# Patient Record
Sex: MALE | Race: WHITE | NOT HISPANIC OR LATINO | Employment: OTHER | URBAN - METROPOLITAN AREA
[De-identification: names, ages, dates, MRNs, and addresses within clinical notes are randomized per-mention and may not be internally consistent; named-entity substitution may affect disease eponyms.]

---

## 2019-03-04 ENCOUNTER — AMBULATORY - HEALTHEAST (OUTPATIENT)
Dept: SCHEDULING | Facility: CLINIC | Age: 79
End: 2019-03-04

## 2019-03-04 DIAGNOSIS — C67.9 BLADDER CANCER (H): ICD-10-CM

## 2019-03-06 ENCOUNTER — RECORDS - HEALTHEAST (OUTPATIENT)
Dept: ADMINISTRATIVE | Facility: OTHER | Age: 79
End: 2019-03-06

## 2019-03-11 ENCOUNTER — HOSPITAL ENCOUNTER (OUTPATIENT)
Dept: INTERVENTIONAL RADIOLOGY/VASCULAR | Facility: HOSPITAL | Age: 79
Discharge: HOME | End: 2019-03-11
Attending: UROLOGY | Admitting: RADIOLOGY

## 2019-03-11 DIAGNOSIS — C67.9 BLADDER CANCER (H): ICD-10-CM

## 2019-03-11 LAB
HGB BLD-MCNC: 9.5 G/DL (ref 14–18)
PLATELET # BLD AUTO: 119 THOU/UL (ref 140–440)

## 2019-03-11 ASSESSMENT — MIFFLIN-ST. JEOR: SCORE: 1381.24

## 2019-03-18 ENCOUNTER — HOSPITAL ENCOUNTER (OUTPATIENT)
Dept: INTERVENTIONAL RADIOLOGY/VASCULAR | Facility: HOSPITAL | Age: 79
Discharge: HOME | End: 2019-03-18
Attending: NURSE PRACTITIONER | Admitting: RADIOLOGY

## 2019-03-18 DIAGNOSIS — N13.9 URINARY OBSTRUCTION: ICD-10-CM

## 2019-07-09 ENCOUNTER — AMBULATORY - HEALTHEAST (OUTPATIENT)
Dept: SCHEDULING | Facility: CLINIC | Age: 79
End: 2019-07-09

## 2019-07-09 DIAGNOSIS — C67.9 BLADDER CANCER (H): ICD-10-CM

## 2021-05-10 ENCOUNTER — OFFICE VISIT (OUTPATIENT)
Dept: FAMILY MEDICINE | Facility: CLINIC | Age: 81
End: 2021-05-10
Payer: COMMERCIAL

## 2021-05-10 VITALS
DIASTOLIC BLOOD PRESSURE: 64 MMHG | OXYGEN SATURATION: 94 % | BODY MASS INDEX: 28.64 KG/M2 | WEIGHT: 189 LBS | RESPIRATION RATE: 18 BRPM | HEIGHT: 68 IN | TEMPERATURE: 97.9 F | SYSTOLIC BLOOD PRESSURE: 116 MMHG | HEART RATE: 101 BPM

## 2021-05-10 DIAGNOSIS — H61.22 EXCESSIVE CERUMEN IN EAR CANAL, LEFT: Primary | ICD-10-CM

## 2021-05-10 DIAGNOSIS — C67.9 MALIGNANT NEOPLASM OF URINARY BLADDER, UNSPECIFIED SITE (H): ICD-10-CM

## 2021-05-10 PROCEDURE — 99202 OFFICE O/P NEW SF 15 MIN: CPT | Mod: 25 | Performed by: NURSE PRACTITIONER

## 2021-05-10 PROCEDURE — 69210 REMOVE IMPACTED EAR WAX UNI: CPT | Mod: LT | Performed by: NURSE PRACTITIONER

## 2021-05-10 ASSESSMENT — MIFFLIN-ST. JEOR: SCORE: 1542.83

## 2021-06-02 VITALS — HEIGHT: 67 IN | BODY MASS INDEX: 24.48 KG/M2 | WEIGHT: 156 LBS

## 2021-06-11 ENCOUNTER — OFFICE VISIT (OUTPATIENT)
Dept: FAMILY MEDICINE | Facility: CLINIC | Age: 81
End: 2021-06-11
Payer: COMMERCIAL

## 2021-06-11 VITALS
BODY MASS INDEX: 29.98 KG/M2 | DIASTOLIC BLOOD PRESSURE: 66 MMHG | SYSTOLIC BLOOD PRESSURE: 130 MMHG | RESPIRATION RATE: 16 BRPM | HEIGHT: 67 IN | TEMPERATURE: 99 F | HEART RATE: 88 BPM | OXYGEN SATURATION: 95 % | WEIGHT: 191 LBS

## 2021-06-11 DIAGNOSIS — H93.A1 PULSATILE TINNITUS OF RIGHT EAR: Primary | ICD-10-CM

## 2021-06-11 DIAGNOSIS — H90.3 BILATERAL SENSORINEURAL HEARING LOSS: ICD-10-CM

## 2021-06-11 DIAGNOSIS — R42 DIZZINESS: ICD-10-CM

## 2021-06-11 LAB
ANION GAP SERPL CALCULATED.3IONS-SCNC: 5 MMOL/L (ref 3–14)
BUN SERPL-MCNC: 20 MG/DL (ref 7–30)
CALCIUM SERPL-MCNC: 8.5 MG/DL (ref 8.5–10.1)
CHLORIDE SERPL-SCNC: 96 MMOL/L (ref 94–109)
CO2 SERPL-SCNC: 26 MMOL/L (ref 20–32)
CREAT SERPL-MCNC: 1.67 MG/DL (ref 0.66–1.25)
ERYTHROCYTE [DISTWIDTH] IN BLOOD BY AUTOMATED COUNT: 13.6 % (ref 10–15)
GFR SERPL CREATININE-BSD FRML MDRD: 38 ML/MIN/{1.73_M2}
GLUCOSE SERPL-MCNC: 88 MG/DL (ref 70–99)
HCT VFR BLD AUTO: 40.7 % (ref 40–53)
HGB BLD-MCNC: 14.1 G/DL (ref 13.3–17.7)
MCH RBC QN AUTO: 32.6 PG (ref 26.5–33)
MCHC RBC AUTO-ENTMCNC: 34.6 G/DL (ref 31.5–36.5)
MCV RBC AUTO: 94 FL (ref 78–100)
PLATELET # BLD AUTO: 196 10E9/L (ref 150–450)
POTASSIUM SERPL-SCNC: 4.6 MMOL/L (ref 3.4–5.3)
RBC # BLD AUTO: 4.33 10E12/L (ref 4.4–5.9)
SODIUM SERPL-SCNC: 127 MMOL/L (ref 133–144)
WBC # BLD AUTO: 5.6 10E9/L (ref 4–11)

## 2021-06-11 PROCEDURE — 85027 COMPLETE CBC AUTOMATED: CPT | Performed by: NURSE PRACTITIONER

## 2021-06-11 PROCEDURE — 80048 BASIC METABOLIC PNL TOTAL CA: CPT | Performed by: NURSE PRACTITIONER

## 2021-06-11 PROCEDURE — 99214 OFFICE O/P EST MOD 30 MIN: CPT | Performed by: NURSE PRACTITIONER

## 2021-06-11 PROCEDURE — 36415 COLL VENOUS BLD VENIPUNCTURE: CPT | Performed by: NURSE PRACTITIONER

## 2021-06-11 ASSESSMENT — PAIN SCALES - GENERAL: PAINLEVEL: NO PAIN (0)

## 2021-06-11 ASSESSMENT — MIFFLIN-ST. JEOR: SCORE: 1540

## 2021-06-11 NOTE — LETTER
June 14, 2021      Deniz Cortes  [id number] [facility] [address]        Dear ,    We are writing to inform you of your test results.    Your test results fall within the expected range(s) or remain unchanged from previous results.  Please continue with current treatment plan.    Resulted Orders   Basic metabolic panel  (Ca, Cl, CO2, Creat, Gluc, K, Na, BUN)   Result Value Ref Range    Sodium 127 (L) 133 - 144 mmol/L    Potassium 4.6 3.4 - 5.3 mmol/L    Chloride 96 94 - 109 mmol/L    Carbon Dioxide 26 20 - 32 mmol/L    Anion Gap 5 3 - 14 mmol/L    Glucose 88 70 - 99 mg/dL    Urea Nitrogen 20 7 - 30 mg/dL    Creatinine 1.67 (H) 0.66 - 1.25 mg/dL    GFR Estimate 38 (L) >60 mL/min/[1.73_m2]      Comment:      Non  GFR Calc  Starting 12/18/2018, serum creatinine based estimated GFR (eGFR) will be   calculated using the Chronic Kidney Disease Epidemiology Collaboration   (CKD-EPI) equation.      GFR Estimate If Black 44 (L) >60 mL/min/[1.73_m2]      Comment:       GFR Calc  Starting 12/18/2018, serum creatinine based estimated GFR (eGFR) will be   calculated using the Chronic Kidney Disease Epidemiology Collaboration   (CKD-EPI) equation.      Calcium 8.5 8.5 - 10.1 mg/dL   CBC with platelets   Result Value Ref Range    WBC 5.6 4.0 - 11.0 10e9/L    RBC Count 4.33 (L) 4.4 - 5.9 10e12/L    Hemoglobin 14.1 13.3 - 17.7 g/dL    Hematocrit 40.7 40.0 - 53.0 %    MCV 94 78 - 100 fl    MCH 32.6 26.5 - 33.0 pg    MCHC 34.6 31.5 - 36.5 g/dL    RDW 13.6 10.0 - 15.0 %    Platelet Count 196 150 - 450 10e9/L     Sodium  level is low. I would recommend reducing amount of water to that he is consuming to help regulate this again. Discussed ENT appointment and follow up if symptoms are not improving or worsening prior to upcoming appointment. Kidney function when compared to previous labs that were completed through care everywhere is stable.   If you have any questions or  concerns, please call the clinic at the number listed above.       Sincerely,      Karolina Fernandez, APRN CNP/mh

## 2021-06-11 NOTE — RESULT ENCOUNTER NOTE
Called patient with results from blood work. NA level is low. I would recommend reducing amount of water to that he is consuming to help regulate this again. Discussed ENT appointment and follow up if symptoms are not improving or worsening prior to upcoming appointment. Kidney function when compared to previous labs that were completed through care everywhere is stable.  Karolina Fernandez, EDGAR CNP

## 2021-06-11 NOTE — PROGRESS NOTES
"    Assessment & Plan       ICD-10-CM    1. Pulsatile tinnitus of right ear  H93.A1 Basic metabolic panel  (Ca, Cl, CO2, Creat, Gluc, K, Na, BUN)     CBC with platelets     OTOLARYNGOLOGY REFERRAL     CANCELED: CTA Head Neck with Contrast   2. Dizziness  R42 Basic metabolic panel  (Ca, Cl, CO2, Creat, Gluc, K, Na, BUN)     CBC with platelets     OTOLARYNGOLOGY REFERRAL     CANCELED: CTA Head Neck with Contrast   3. Bilateral sensorineural hearing loss  H90.3 OTOLARYNGOLOGY REFERRAL     CANCELED: CTA Head Neck with Contrast     Would like to rule out a neurologic cause of the pulsatile tinnitus of dizziness and hearing loss.  Further evaluation warranted.  Initially was going to get a CTA of the head however decided an ENT referral for their decision on appropriate imaging for this individual.  Will get labs today to evaluate for any significant abnormalities.  Advised patient to continue to manage his dizziness by increasing his water intake as this has been working for him.  If symptoms significantly worsen I did advise patient to follow-up sooner.  ENT appointment is scheduled for 6/23.             BMI:   Estimated body mass index is 29.91 kg/m  as calculated from the following:    Height as of this encounter: 1.702 m (5' 7\").    Weight as of this encounter: 86.6 kg (191 lb).           Return if symptoms worsen or fail to improve.    EDGAR Rooney CNP  [facility]   Sarbjit is a 81 year old who presents for the following health issues     HPI   PT needs hearing aids  Dizziness  Onset/Duration: 1 month  Description:   Do you feel faint: no  Does it feel like the surroundings (bed, room) are moving: YES  Unsteady/off balance: YES  Have you passed out or fallen: no  Intensity: moderate, severe  Progression of Symptoms: same and intermittent  Accompanying Signs & Symptoms:  Heart palpitations or chest pain: no  Nausea, vomiting: YES- went first started but not now  Weakness " or lack of coordination in arms or legs: no  Vision or speech changes: YES- vision  Numbness or tingling: no  Ringing in ears (Tinnitus): YES- little can hear his heart beat  Hearing Loss: YES  History:   Head trauma/concussion history: YES- 60 years ago  Previous similar symptoms: no  Recent bleeding history: no  Any new medications (BP?): no  Precipitating factors:   Worse with activity: not sure  Worse with head movement: YES  Alleviating factors:   Does staying in a fixed position give relief: no   Therapies tried and outcome: None    He came in on 5/10 and had his ears cleaned. He reports within a couple of days of getting his ears cleaned he started to notice dizziness. He increased his water intake to a full glass of water every 2 hours and then he doesn't get dizzy, reporting about 80oz a day. When he drinks more water he then is able to resolve the dizziness.  Then empty's his leg bag. He also mentions persistent pulsatile tinnitus in his right ear. No left sided symptoms. Dizziness occurs when he is sitting and the whole room is spinning. He has been aware of the dizziness and assures he sits down if it occurs when he is standing. He does not notice any symptoms with position changes.     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      Objective    /66 (BP Location: Right arm, Patient Position: Chair, Cuff Size: Adult Regular)   Pulse 88    "Temp 99  F (37.2  C) (Tympanic)   Resp 16   Ht 1.702 m (5' 7\")   Wt 86.6 kg (191 lb)   SpO2 95%   BMI 29.91 kg/m    Body mass index is 29.91 kg/m .     Physical Exam   GENERAL: healthy, alert and no distress  HENT: ear canals and TM's normal, nose and mouth without ulcers or lesions  NECK: no adenopathy, no asymmetry, masses, or scars and thyroid normal to palpation  RESP: lungs clear to auscultation - no rales, rhonchi or wheezes  CV: regular rate and rhythm, normal S1 S2, no S3 or S4, no murmur, click or rub, no peripheral edema and peripheral pulses strong  ABDOMEN: soft, nontender, no hepatosplenomegaly, no masses and bowel sounds normal  MS: no gross musculoskeletal defects noted, no edema  NEURO: Normal strength and tone, mentation intact and speech normal                "

## 2021-06-22 NOTE — PROGRESS NOTES
Chief Complaint   Patient presents with     Tinnitus     Check hearing/ears/pulsatile tinnitus- issues for a few years/did have chemo and this a happened awhile after this was done/he has gotten use to it but hearing is decreased also     History of Present Illness  Deniz Cortes is a 81 year old male who presents for ear evaluation.  I am seeing this patient in consultation for pulsatile tinnitus of right ear, dizziness, bilateral sensorineural hearing loss at the request of the provider Karolina Fernandez CNP.  The patient describes long history of decreased hearing in both ears worse in his right ear.  He is also had intermittent problems with pulsatile tinnitus in the right ear for several months to over a year.  He denies any otalgia, otorrhea, bloody otorrhea.  He does not really feel like he has much dizziness or unsteadiness.  He is not had previous ear surgery.  He did have bladder cancer treated with chemotherapy about 2 years ago.  He does have some noise exposure history in the past.      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     CARDIOVASCULAR SCREENING; LDL GOAL LESS THAN 160     Advanced directives, counseling/discussion     Cataract     Malignant neoplasm of urinary bladder, unspecified site (H)     Current Medications    Current Outpatient Medications:      acetaminophen (TYLENOL) 500 MG tablet, Take 500-1,000 mg by mouth, Disp: , Rfl:      aspirin 325 MG EC tablet, Take by mouth daily, Disp: 40 tablet, Rfl:      betamethasone dipropionate (DIPROSONE) 0.05 % external lotion, Apply topically to affected area(s) 2 times daily., Disp: , Rfl:      docusate sodium (DSS) 100 MG capsule, Take 100 mg by mouth, Disp: , Rfl:      EPINEPHrine (EPIPEN) 0.3 MG/0.3ML injection, Inject 0.3 mLs into the muscle once as needed for anaphylaxis for 1 dose. (Patient not taking: Reported on 5/10/2021), Disp: 1 each, Rfl: 3    Allergies   Allergies   Allergen  Reactions     Bee Venom Anaphylaxis     Pcn [Penicillins] Nausea and Vomiting     vomiting       Social History   Social History     Socioeconomic History     Marital status:      Spouse name: Not on file     Number of children: Not on file     Years of education: Not on file     Highest education level: Not on file   Occupational History     Not on file   Social Needs     Financial resource strain: Not on file     Food insecurity     Worry: Not on file     Inability: Not on file     Transportation needs     Medical: Not on file     Non-medical: Not on file   Tobacco Use     Smoking status: Former Smoker     Types: Cigarettes     Quit date: 2007     Years since quittin.1     Smokeless tobacco: Never Used   Substance and Sexual Activity     Alcohol use: No     Drug use: No     Sexual activity: Not Currently   Lifestyle     Physical activity     Days per week: Not on file     Minutes per session: Not on file     Stress: Not on file   Relationships     Social connections     Talks on phone: Not on file     Gets together: Not on file     Attends Christianity service: Not on file     Active member of club or organization: Not on file     Attends meetings of clubs or organizations: Not on file     Relationship status: Not on file     Intimate partner violence     Fear of current or ex partner: Not on file     Emotionally abused: Not on file     Physically abused: Not on file     Forced sexual activity: Not on file   Other Topics Concern     Parent/sibling w/ CABG, MI or angioplasty before 65F 55M? No   Social History Narrative     Not on file       Family History  Family History   Problem Relation Age of Onset     Cardiovascular Father        Review of Systems  As per HPI and PMHx, otherwise 10+ comprehensive system review is negative.    Physical Exam  BP (!) 146/69 (BP Location: Left arm, Patient Position: Sitting, Cuff Size: Adult Regular)   Pulse 75   Temp 98.5  F (36.9  C) (Tympanic)   Ht 1.702 m (5'  "7\")   Wt 90.7 kg (200 lb)   BMI 31.32 kg/m    GENERAL: Patient is a pleasant, cooperative 81 year old male in no acute distress.  HEAD: Normocephalic, atraumatic.  Hair and scalp are normal.  EYES: Pupils are equal, round, reactive to light and accommodation.  Extraocular movements are intact.  The sclera nonicteric without injection.  The extraocular structures are normal.  EARS: See below.  NOSE: Nares are patent.  Nasal mucosa is slightly dry.  The patient is a fairly significant caudal nasal septal deviation towards the left.  No nasal cavity masses, polyps, or mucopurulence on anterior rhinoscopy.  NEUROLOGIC: Cranial nerves II through XII are grossly intact.  Voice is strong.  Patient is House-Brackmann I/VI bilaterally.     CARDIOVASCULAR: Extremities are warm and well-perfused.  No significant peripheral edema.  RESPIRATORY: Patient has nonlabored breathing without cough, wheeze, stridor.  PSYCHIATRIC: Patient is alert and oriented.  Mood and affect appear normal.  SKIN: Warm and dry.  No scalp, face, or neck lesions noted.    Physical Exam and Procedure    EARS: Normal shape and symmetry.  No tenderness when palpating the mastoid or tragal areas bilaterally.  The ears were then examined under the otic binocular microscopel.  Otoscopic exam on the right reveals a clear canal.  The right tympanic membrane was round, intact with evidence of serous middle ear effusion.  No retraction, granulation, drainage, or evidence of cholesteatoma.      Attention was turned to the left ear.  Otoscopic exam on the left reveals a mostly clear canal, there are few hairs that I removed with an alligator forceps. The left tympanic membrane was round, intact without evidence of effusion.  No retraction, granulation, drainage, or evidence of cholesteatoma.      Audiogram  The patient underwent an audiogram performed today.  My review of the audiogram shows moderate sloping to profound mixed hearing loss in the right ear and " moderately severe merrily sensorineural, possibly some mixed component in the left ear.  Pure-tone average is 78 dB on the right and 76 dB on the left.  Speech reception threshhold is 60 dB on the right and 85 dB on the left.  The patient had 60% word recognition on the right and 56% word recognition on the left.  The patient had a low volume type B tympanogram on the right and a type A shallow tympanogram on the left.     Assessment and Plan     ICD-10-CM    1. 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 OME (otitis media with effusion), right  H65.91    3. Pulsatile tinnitus, right ear  H93.A1      It was my pleasure seeing Deniz Cortes today in clinic.  The patient presents with bilateral mixed hearing loss more so in the right ear.  He has obvious middle ear effusion on exam on the right.  He has a fairly significant sensorineural component in both ears.  He would be a good candidate for bilateral hearing aids.  We discussed ear tube placement in the right ear to help remove the fluid and help his hearing.  Even if we clear his effusion, he will likely still need hearing aids.  I would recommend placement of an ear tube on the right today in office.  We can see him back for follow-up and recheck the hearing.  We can also set him up for hearing aid evaluation.    We discussed the risks, benefits, alternatives, options of right myringotomy with tube placement including, but not limited to: Risk of bleeding, risk of infection, risk of retained tympanostomy tube, risk of tympanic membrane perforation, risk of recurrent otorrhea, tympanostomy tube failure, potential need for additional procedures including tube removal/replacement.  We discussed the postoperative course and convalescence including using eardrops.  The patient voiced understanding and is willing to proceed.  Please see the procedure note for further details.    Sarbjit to follow up with Primary Care provider regarding  elevated blood pressure.    Sundar Yang MD  Department of Otolarygology-Head and Neck Surgery  [facility]

## 2021-06-23 ENCOUNTER — OFFICE VISIT (OUTPATIENT)
Dept: AUDIOLOGY | Facility: CLINIC | Age: 81
End: 2021-06-23
Payer: COMMERCIAL

## 2021-06-23 ENCOUNTER — OFFICE VISIT (OUTPATIENT)
Dept: OTOLARYNGOLOGY | Facility: CLINIC | Age: 81
End: 2021-06-23
Payer: COMMERCIAL

## 2021-06-23 VITALS
TEMPERATURE: 98.5 F | BODY MASS INDEX: 31.39 KG/M2 | SYSTOLIC BLOOD PRESSURE: 146 MMHG | DIASTOLIC BLOOD PRESSURE: 69 MMHG | HEART RATE: 75 BPM | WEIGHT: 200 LBS | HEIGHT: 67 IN

## 2021-06-23 DIAGNOSIS — H90.6 MIXED CONDUCTIVE AND SENSORINEURAL HEARING LOSS OF BOTH EARS: Primary | ICD-10-CM

## 2021-06-23 DIAGNOSIS — H65.91 OME (OTITIS MEDIA WITH EFFUSION), RIGHT: ICD-10-CM

## 2021-06-23 DIAGNOSIS — H93.A9 PULSATILE TINNITUS: ICD-10-CM

## 2021-06-23 DIAGNOSIS — H93.A1 PULSATILE TINNITUS, RIGHT EAR: ICD-10-CM

## 2021-06-23 DIAGNOSIS — H90.6 MIXED HEARING LOSS, BILATERAL: Primary | ICD-10-CM

## 2021-06-23 PROCEDURE — 99207 PR NO CHARGE LOS: CPT | Performed by: AUDIOLOGIST

## 2021-06-23 PROCEDURE — 69433 CREATE EARDRUM OPENING: CPT | Mod: RT | Performed by: OTOLARYNGOLOGY

## 2021-06-23 PROCEDURE — 92550 TYMPANOMETRY & REFLEX THRESH: CPT | Performed by: AUDIOLOGIST

## 2021-06-23 PROCEDURE — 92557 COMPREHENSIVE HEARING TEST: CPT | Performed by: AUDIOLOGIST

## 2021-06-23 PROCEDURE — 99203 OFFICE O/P NEW LOW 30 MIN: CPT | Mod: 25 | Performed by: OTOLARYNGOLOGY

## 2021-06-23 RX ORDER — DOCUSATE SODIUM 100 MG/1
100 CAPSULE, LIQUID FILLED ORAL
COMMUNITY
Start: 2019-07-05 | End: 2023-08-17

## 2021-06-23 RX ORDER — ACETAMINOPHEN 500 MG
500-1000 TABLET ORAL
COMMUNITY
Start: 2019-07-05 | End: 2021-08-16

## 2021-06-23 RX ORDER — BETAMETHASONE DIPROPIONATE 0.5 MG/G
LOTION TOPICAL
COMMUNITY
Start: 2021-03-29 | End: 2023-08-17

## 2021-06-23 ASSESSMENT — MIFFLIN-ST. JEOR: SCORE: 1580.82

## 2021-06-23 NOTE — PROGRESS NOTES
AUDIOLOGY REPORT:    Patient was referred to [facility] from ENT by Dr. Yang for a hearing examination. Patient reports a fluctuating hearing loss of left ear for the past year. Patient also reports he can hear his heartbeat in his ears. Patient was unaccompanied to today's visit.     Testing:    Otoscopy:   Otoscopic exam indicates ears are clear of cerumen bilaterally     Tympanograms:    RIGHT: restricted eardrum mobility (Type B)     LEFT:   restricted eardrum mobility (Type B)    Reflexes (reported by stimulus ear): 1000 Hz  RIGHT: Ipsilateral is could not seal  RIGHT: Contralateral is could not seal  LEFT:   Ipsilateral is absent at frequencies tested  LEFT:   Contralateral is absent at frequencies tested    Thresholds:   Pure Tone Thresholds assessed using conventional audiometry with good  reliability from 250-8000 Hz bilaterally using insert earphones and circumaural headphones     RIGHT:  moderate and profound mixed hearing loss    LEFT:    moderate-severe and severe mixed hearing loss    NOTE: Change in transducers did not merit a change in thresholds. Could not mask for bone conduction at 3000 & 4000 Hz bilaterally and 250 & 500 Hz in the right ear due to equipment limits and patient tolerance.     Speech Reception Threshold:    RIGHT: 60 dB HL    LEFT:   85 dB HL    Word Recognition Score:     RIGHT: 60% at 90 dB HL using NU-6 recorded word list.    LEFT:   56% at 100 dB HL using NU-6 recorded word list.    Discussed results with the patient.     Patient was returned to ENT for follow up.     Elier Jorgensen CCC-A  Licensed Audiologist  6/23/2021

## 2021-06-23 NOTE — PATIENT INSTRUCTIONS
Per physician instructions      If you have questions or concerns on any instructions given to you by your provider today or if you need to schedule an appointment, you can reach us at [phone].

## 2021-06-23 NOTE — NURSING NOTE
"Initial BP (!) 146/69 (BP Location: Left arm, Patient Position: Sitting, Cuff Size: Adult Regular)   Pulse 75   Temp 98.5  F (36.9  C) (Tympanic)   Ht 1.702 m (5' 7\")   Wt 90.7 kg (200 lb)   BMI 31.32 kg/m   Estimated body mass index is 31.32 kg/m  as calculated from the following:    Height as of this encounter: 1.702 m (5' 7\").    Weight as of this encounter: 90.7 kg (200 lb). .    Gemma Roman CMA    "

## 2021-06-23 NOTE — PROCEDURES
PREOPERATIVE DIAGNOSES: Right chronic otitis media with effusion, mixed hearing loss.    POSTOPERATIVE DIAGNOSES: Same.     PROCEDURE PERFORMED:   1. Right myringotomy with tympanostomy tube placement     SURGEON: Sundar Yang MD      ASSISTANTS: None.     BLOOD LOSS: Scant.     COMPLICATIONS: None.      SPECIMENS: None.     ANESTHESIA: Local.     GRAFTS, IMPLANTS, DRAINS: None.     INDICATIONS: Prevent complications, treat disease.    FINDINGS:   1. Right intact tympanic membrane with serous middle-ear effusion.    OPERATIVE TECHNIQUE: The patient was brought to the procedure room and identified by name clinic number.  They were placed supinely on the procedure chair.  The patient was prepped and draped in standard fashion.  After standard surgical pause, the microscope was brought to the field and used throughout the remainder of the case.  I first examined the ear on the right.  Cerumen was cleaned with curette.  Phenol was placed in the posterior-inferior quadrant.  A radial myringotomy was made in the posterior-inferior quadrant.  The middle ear was suctioned free.  The patient noticed improvement in hearing.  A Dura-Vent ear tube was placed into the myringotomy.       This marked the end of the procedure.  The patient tolerated the procedure well.  There were no complications.  There was minimal blood loss.  All standard protocol and universal precautions were used throughout the procedure.    Sundar Yang MD  Department of Otolarygology-Head and Neck Surgery  [facility]

## 2021-06-23 NOTE — LETTER
6/23/2021         RE: Deniz Cortes  [id number] [facility] [address]        Dear Colleague,    Thank you for referring your patient, Deniz Cortes, to the [facility]. Please see a copy of my visit note below.    Chief Complaint   Patient presents with     Tinnitus     Check hearing/ears/pulsatile tinnitus- issues for a few years/did have chemo and this a happened awhile after this was done/he has gotten use to it but hearing is decreased also     History of Present Illness  Deniz Cortes is a 81 year old male who presents for ear evaluation.  I am seeing this patient in consultation for pulsatile tinnitus of right ear, dizziness, bilateral sensorineural hearing loss at the request of the provider Karolina Fernandez CNP.  The patient describes long history of decreased hearing in both ears worse in his right ear.  He is also had intermittent problems with pulsatile tinnitus in the right ear for several months to over a year.  He denies any otalgia, otorrhea, bloody otorrhea.  He does not really feel like he has much dizziness or unsteadiness.  He is not had previous ear surgery.  He did have bladder cancer treated with chemotherapy about 2 years ago.  He does have some noise exposure history in the past.      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     CARDIOVASCULAR SCREENING; LDL GOAL LESS THAN 160     Advanced directives, counseling/discussion     Cataract     Malignant neoplasm of urinary bladder, unspecified site (H)     Current Medications    Current Outpatient Medications:      acetaminophen (TYLENOL) 500 MG tablet, Take 500-1,000 mg by mouth, Disp: , Rfl:      aspirin 325 MG EC tablet, Take by mouth daily, Disp: 40 tablet, Rfl:      betamethasone dipropionate (DIPROSONE) 0.05 % external lotion, Apply topically to affected area(s) 2 times daily., Disp: , Rfl:      docusate sodium (DSS) 100 MG  capsule, Take 100 mg by mouth, Disp: , Rfl:      EPINEPHrine (EPIPEN) 0.3 MG/0.3ML injection, Inject 0.3 mLs into the muscle once as needed for anaphylaxis for 1 dose. (Patient not taking: Reported on 5/10/2021), Disp: 1 each, Rfl: 3    Allergies   Allergies   Allergen Reactions     Bee Venom Anaphylaxis     Pcn [Penicillins] Nausea and Vomiting     vomiting       Social History   Social History     Socioeconomic History     Marital status:      Spouse name: Not on file     Number of children: Not on file     Years of education: Not on file     Highest education level: Not on file   Occupational History     Not on file   Social Needs     Financial resource strain: Not on file     Food insecurity     Worry: Not on file     Inability: Not on file     Transportation needs     Medical: Not on file     Non-medical: Not on file   Tobacco Use     Smoking status: Former Smoker     Types: Cigarettes     Quit date: 2007     Years since quittin.1     Smokeless tobacco: Never Used   Substance and Sexual Activity     Alcohol use: No     Drug use: No     Sexual activity: Not Currently   Lifestyle     Physical activity     Days per week: Not on file     Minutes per session: Not on file     Stress: Not on file   Relationships     Social connections     Talks on phone: Not on file     Gets together: Not on file     Attends Yarsani service: Not on file     Active member of club or organization: Not on file     Attends meetings of clubs or organizations: Not on file     Relationship status: Not on file     Intimate partner violence     Fear of current or ex partner: Not on file     Emotionally abused: Not on file     Physically abused: Not on file     Forced sexual activity: Not on file   Other Topics Concern     Parent/sibling w/ CABG, MI or angioplasty before 65F 55M? No   Social History Narrative     Not on file       Family History  Family History   Problem Relation Age of Onset     Cardiovascular Father   "      Review of Systems  As per HPI and PMHx, otherwise 10+ comprehensive system review is negative.    Physical Exam  BP (!) 146/69 (BP Location: Left arm, Patient Position: Sitting, Cuff Size: Adult Regular)   Pulse 75   Temp 98.5  F (36.9  C) (Tympanic)   Ht 1.702 m (5' 7\")   Wt 90.7 kg (200 lb)   BMI 31.32 kg/m    GENERAL: Patient is a pleasant, cooperative 81 year old male in no acute distress.  HEAD: Normocephalic, atraumatic.  Hair and scalp are normal.  EYES: Pupils are equal, round, reactive to light and accommodation.  Extraocular movements are intact.  The sclera nonicteric without injection.  The extraocular structures are normal.  EARS: See below.  NOSE: Nares are patent.  Nasal mucosa is slightly dry.  The patient is a fairly significant caudal nasal septal deviation towards the left.  No nasal cavity masses, polyps, or mucopurulence on anterior rhinoscopy.  NEUROLOGIC: Cranial nerves II through XII are grossly intact.  Voice is strong.  Patient is House-Brackmann I/VI bilaterally.     CARDIOVASCULAR: Extremities are warm and well-perfused.  No significant peripheral edema.  RESPIRATORY: Patient has nonlabored breathing without cough, wheeze, stridor.  PSYCHIATRIC: Patient is alert and oriented.  Mood and affect appear normal.  SKIN: Warm and dry.  No scalp, face, or neck lesions noted.    Physical Exam and Procedure    EARS: Normal shape and symmetry.  No tenderness when palpating the mastoid or tragal areas bilaterally.  The ears were then examined under the otic binocular microscopel.  Otoscopic exam on the right reveals a clear canal.  The right tympanic membrane was round, intact with evidence of serous middle ear effusion.  No retraction, granulation, drainage, or evidence of cholesteatoma.      Attention was turned to the left ear.  Otoscopic exam on the left reveals a mostly clear canal, there are few hairs that I removed with an alligator forceps. The left tympanic membrane was round, " intact without evidence of effusion.  No retraction, granulation, drainage, or evidence of cholesteatoma.      Audiogram  The patient underwent an audiogram performed today.  My review of the audiogram shows moderate sloping to profound mixed hearing loss in the right ear and moderately severe merrily sensorineural, possibly some mixed component in the left ear.  Pure-tone average is 78 dB on the right and 76 dB on the left.  Speech reception threshhold is 60 dB on the right and 85 dB on the left.  The patient had 60% word recognition on the right and 56% word recognition on the left.  The patient had a low volume type B tympanogram on the right and a type A shallow tympanogram on the left.     Assessment and Plan     ICD-10-CM    1. 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 OME (otitis media with effusion), right  H65.91    3. Pulsatile tinnitus, right ear  H93.A1      It was my pleasure seeing Deniz Cortes today in clinic.  The patient presents with bilateral mixed hearing loss more so in the right ear.  He has obvious middle ear effusion on exam on the right.  He has a fairly significant sensorineural component in both ears.  He would be a good candidate for bilateral hearing aids.  We discussed ear tube placement in the right ear to help remove the fluid and help his hearing.  Even if we clear his effusion, he will likely still need hearing aids.  I would recommend placement of an ear tube on the right today in office.  We can see him back for follow-up and recheck the hearing.  We can also set him up for hearing aid evaluation.    We discussed the risks, benefits, alternatives, options of right myringotomy with tube placement including, but not limited to: Risk of bleeding, risk of infection, risk of retained tympanostomy tube, risk of tympanic membrane perforation, risk of recurrent otorrhea, tympanostomy tube failure, potential need for additional procedures including  tube removal/replacement.  We discussed the postoperative course and convalescence including using eardrops.  The patient voiced understanding and is willing to proceed.  Please see the procedure note for further details.    Sarbjit to follow up with Primary Care provider regarding elevated blood pressure.    Sundar Yang MD  Department of Otolarygology-Head and Neck Surgery  [facility]         Again, thank you for allowing me to participate in the care of your patient.        Sincerely,        Sundar Yang MD

## 2021-06-24 NOTE — H&P
H&P documented within 30 days. I have performed and assessment and examined the patient, as necessary, to update the patient's current status that may have changed since the prior History and Physical.       Patricia Hood MD, [facility]  Vascular and Interventional Radiology  Pager: [phone]  Clinic: [phone]

## 2021-06-24 NOTE — H&P
[facility] Radiology History and Physical Note    Procedure Requested: Right ureteral stent placement, right nephrostomy tube removal   Requesting Provider: Dr. Sanchez    HPI: Deniz Cortes is a 79 y.o. old male with a history of bladder cancer with the mass obstructing his right ureter causing right hydronephrosis s/p right PNT placement at [facility] 2/13/19.  Patient is currently undergoing chemotherapy.  He presents back per request of urology, Dr. Sanchez today for right ureteral stent placement and right nephrostomy tube removal.      IMAGING:   IR Right PNT Placement 2/13/19:    FINDINGS:    Ultrasound demonstrates moderate right-sided hydronephrosis.    The antegrade nephrostogram confirms access into the right renal through the lower pole. Moderate hydronephrosis is present.  The completion image shows the right percutaneous nephrostomy with loop formed within the right renal pelvis.    IMPRESSION:      Successful right percutaneous nephrostomy placement, as detailed above.      NPO Status: Midnight   Anticoagulation/Antiplatelets/Bleeding tendencies: None   Antibiotics: Levaquin dose ordered pre procedure       PAST MEDICAL HISTORY:   Past Medical History:   Diagnosis Date     Cancer (H)     bladder       PAST SURGICAL HISTORY:  History reviewed. No pertinent surgical history.    ALLERGIES:  Penicillins    MEDICATIONS:  Current Outpatient Medications   Medication Sig Dispense Refill     docusate sodium (COLACE) 100 MG capsule Take 100 mg by mouth 2 (two) times a day as needed for constipation.       ondansetron (ZOFRAN) 4 MG tablet Take 4 mg by mouth every 8 (eight) hours as needed for nausea.       Current Facility-Administered Medications   Medication Dose Route Frequency Provider Last Rate Last Dose     levoFLOXacin 500 mg/100 mL IVPB 500 mg (LEVAQUIN)  500 mg Intravenous 30 Min Pre-Op Carina Mercado, NP         sodium chloride bacteriostatic 0.9 % injection 0.1-0.3 mL  0.1-0.3 mL  "Subcutaneous PRN Carina Mercado, NP         sodium chloride flush 3 mL (NS)  3 mL Intravenous Line Care Carina Mercado NP           LABS:  No results found for: INR  Hemoglobin (g/dL)   Date Value   03/11/2019 9.5 (L)     3/11/2019 Platelets pending     EXAM:  /68   Pulse 81   Temp 97.8  F (36.6  C) (Oral)   Resp 16   Ht 5' 7\" (1.702 m)   Wt 156 lb (70.8 kg)   SpO2 97%   BMI 24.43 kg/m    General: Lying in bed, pleasantly talkative, in no acute distress.  Neuro: A&O x 3. Moves all extremities equally.  Resp: Lungs clear to auscultation bilaterally.  Cardio: S1S2 and reg, without murmur, clicks or rubs  Abdomen/: Soft, non-distended, non-tender.  Right PNT to gravity bag with clear yellow urine.  Skin exit site c/d/i.  No leaking.    MSK: No gross motor weakness. Sensation intact.       Pre-Sedation Assessment:  Mallampati Airway Classification: Class 2: upper half of tonsil fossa visible  Previous reaction to anesthesia/sedation: no  Sedation plan based on assessment: Moderate  Sleep Apnea: no  Dentures: no  COPD: no  ASA Classification: ASA 3 - Patient with moderate systemic disease with functional limitations  Comments: no hx of asthma     ASSESSMENT: 79 y.o. old male with a history of bladder cancer with the mass obstructing his right ureter causing right hydronephrosis s/p right PNT placement at [facility] 2/13/19 currently undergoing chemotherapy that presents back per request of urology, Dr. Sanchez today for right ureteral stent placement and right nephrostomy tube removal.      Platelets pending.      PLAN: Attempt at right ureteral stent placement with possible right PNT removal with sedation as needed.      The procedure, risks and moderate sedation were discussed with the patient, all questions answered and patient agrees to proceed with the procedure. Written consent obtained.    Carina TAYLOR [facility]: Interventional Radiology   [phone]  "

## 2021-06-25 NOTE — H&P
[facility] Radiology History and Physical Note  Date/Time: 3/18/2019/7:35 AM    Procedure Requested: Right Nephrostogram   Requesting Provider: Jacki Mercado CNP    HPI: Deniz Cortes is a 79 y.o. male with a history of bladder cancer with the mass obstructing his right ureter causing right hydronephrosis s/p right PNT placement at [facility] 2/13/19, s/p right ureteral stent placement 3/5/19 and right PNT was capped.  Patient is currently undergoing chemotherapy. Patient presents today for right nephrostogram with possible removal of right PNT. Patient denies problems since capping trial initiated, fevers, chills, pain, etc.     IMAGING:   [facility] RADIOLOGY  LOCATION: [facility]  DATE: 3/11/2019  PROCEDURE: ANTEGRADE PYELOGRAM, PERCUTANEOUS URETERAL STENT PLACEMENT WITH NEPHROSTOMY TUBE EXCHANGE  INTERVENTIONAL RADIOLOGIST: Patricia Hood MD  INDICATION: Right ureter vesicular junction obstruction status post nephrostomy tube placement. Plan for right ureteral stent placement.    IMPRESSION:   1. Successful right ureteral stent placement. A 10 Slovak nephrostomy tube was left behind and capped.     PLAN: Repeat antegrade nephrostogram later this week followed by likely nephrostomy tube removal under fluoroscopic guidance.    Anticoagulation/Antiplatelets/Bleeding tendencies: None  Antibiotics: None    REVIEW OF SYMPTOMS: A 10 point comprehensive review of systems was negative except as noted above in HPI    PAST MEDICAL HISTORY:   Past Medical History:   Diagnosis Date     Cancer (H)     bladder       PAST SURGICAL HISTORY:   Past Surgical History:   Procedure Laterality Date     IR NEPHROURETERAL CATHETER PLACEMENT RIGHT  3/11/2019       ALLERGIES:  Penicillins    MEDICATIONS:  Current Outpatient Medications   Medication Sig Dispense Refill     docusate sodium (COLACE) 100 MG capsule Take 100 mg by mouth 2 (two) times a day.              ondansetron (ZOFRAN) 4 MG tablet Take 4 mg by mouth every 8  (eight) hours as needed for nausea.       No current facility-administered medications for this encounter.      EXAM:  There were no vitals taken for this visit.  General: Stable. In no acute distress.  Neuro: A&O x 3.   Resp: Non labored respirations   : Right PNT exit site without erythema or drainage, capped.     ASSESSMENT: 79 year old male with hx bladder cancer with mass obstructing his right ureter causing right hydronephrosis s/p right PNT placement at [facility] 2/13/19, s/p right ureteral stent placement 3/5/19     PLAN: Right nephrostogram with possible right nephrostomy tube removal     The procedure and risks were discussed with the patient, all questions answered and patient agrees to proceed with the procedure. Written consent obtained.    Arely Mojica, CNP    [facility]: Interventional Radiology   [phone]

## 2021-06-25 NOTE — PROCEDURES
IR Procedure Note    Physician: Lorenzo Apodaca MD    Procedure:  Right nephrostogram    Findings/Plan:  Right nephrostogram through the existing right nephrostomy tube demonstrates patent right ureteral stent with adequate prompt flow of contrast through the stent into the bladder.  The right nephrostomy tube was removed.

## 2021-07-16 NOTE — PROGRESS NOTES
Chief Complaint   Patient presents with     Ent Problem     Check hearing/ears- right ear drainage since tube placement -6-23-21     History of Present Illness  Deniz Cortes is a 81 year old male who presents today for follow-up.  I evaluated the patient on 6/23/2021.  He was having pulsatile tinnitus in the right ear and decreased hearing bilaterally that was worse on the right.  He had obvious middle ear effusion on the right-hand side.  We placed an ear tube on the right.  The patient returns today for follow-up audiometric assessment.    Since last seeing the patient, the patient reports intermittent right ear drainage since placement on 6/23/2021.  He denies any otalgia or bloody otorrhea.  The otorrhea is yellow but not foul-smelling.  He has some imbalance but no significant vertigo.  He has had some noise exposure history in the past.  Aside from his recent ear tube, no other prior history of ear surgery.    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  Call if drainage persistent past 10 days., Disp: 10 mL, Rfl: 3     acetaminophen (TYLENOL) 500 MG tablet, Take 500-1,000 mg by mouth (Patient not taking: Reported on 7/21/2021), Disp: , Rfl:      docusate sodium (DSS) 100 MG capsule, Take 100 mg by mouth (Patient not taking: Reported on 7/21/2021), Disp: , Rfl:      EPINEPHrine (EPIPEN) 0.3  MG/0.3ML injection, Inject 0.3 mLs into the muscle once as needed for anaphylaxis for 1 dose. (Patient not taking: Reported on 5/10/2021), Disp: 1 each, Rfl: 3    Allergies  Allergies   Allergen Reactions     Augmentin Hives     Bee Venom Anaphylaxis     Lac Bovis GI Disturbance     Pcn [Penicillins] Nausea and Vomiting     vomiting       Social History  Social History     Socioeconomic History     Marital status:      Spouse name: Not on file     Number of children: Not on file     Years of education: Not on file     Highest education level: Not on file   Occupational History     Not on file   Tobacco Use     Smoking status: Former Smoker     Types: Cigarettes     Quit date: 2007     Years since quittin.1     Smokeless tobacco: Never Used   Substance and Sexual Activity     Alcohol use: No     Drug use: No     Sexual activity: Not Currently   Other Topics Concern     Parent/sibling w/ CABG, MI or angioplasty before 65F 55M? No   Social History Narrative     Not on file     Social Determinants of Health     Financial Resource Strain:      Difficulty of Paying Living Expenses:    Food Insecurity:      Worried About Running Out of Food in the Last Year:      Ran Out of Food in the Last Year:    Transportation Needs:      Lack of Transportation (Medical):      Lack of Transportation (Non-Medical):    Physical Activity:      Days of Exercise per Week:      Minutes of Exercise per Session:    Stress:      Feeling of Stress :    Social Connections:      Frequency of Communication with Friends and Family:      Frequency of Social Gatherings with Friends and Family:      Attends Tenriism Services:      Active Member of Clubs or Organizations:      Attends Club or Organization Meetings:      Marital Status:    Intimate Partner Violence:      Fear of Current or Ex-Partner:      Emotionally Abused:      Physically Abused:      Sexually Abused:        Family History  Family History   Problem Relation Age of  "Onset     Cardiovascular Father        Review of Systems  As per HPI and PMHx, otherwise 10 system review including the head and neck, constitutional, eyes, respiratory, GI, skin, neurologic, lymphatic, endocrine, and allergy systems is negative.    Physical Exam  BP (!) 152/83 (BP Location: Right arm, Patient Position: Sitting, Cuff Size: Adult Regular)   Pulse 81   Temp 98.8  F (37.1  C) (Tympanic)   Ht 1.702 m (5' 7\")   Wt 87.1 kg (192 lb)   BMI 30.07 kg/m    GENERAL: Patient is a pleasant, cooperative 81 year old male in no acute distress.  HEAD: Normocephalic, atraumatic.  Hair and scalp are normal.  EYES: Pupils are equal, round, reactive to light and accommodation.  Extraocular movements are intact.  The sclera nonicteric without injection.  The extraocular structures are normal.  EARS: See below.  NEUROLOGIC: Cranial nerves II through XII are grossly intact.  Voice is strong.  Facial nerve examination incomplete due to the patient wearing a mask.    CARDIOVASCULAR: Extremities are warm and well-perfused.  No significant peripheral edema.  RESPIRATORY: Patient has nonlabored breathing without cough, wheeze, stridor.  PSYCHIATRIC: Patient is alert and oriented.  Mood and affect appear normal.  SKIN: Warm and dry.  No scalp, face, or neck lesions noted.    Physical Exam and Procedure    EARS: Normal shape and symmetry.  No tenderness when palpating the mastoid or tragal areas bilaterally.  The ears were then examined under the otic binocular microscope to perform cerumen removal.  Otoscopic exam on the right reveals copious otorrhea and impacted cerumen down to the level of the tympanic membrane.  Took a culture of the otorrhea and sent for microbiologic analysis.  The otorrhea and cerumen were removed with #3 and #5 suction.  There is a Dura-Vent ear tube in the posterior-inferior quadrant.  The middle ear space was suctioned free.  There is no granulation.  No significant retraction or evidence of " cholesteatoma.      Attention was turned to the left ear.  Otoscopic exam on the left reveals a minimal amount of cerumen.  The left tympanic membrane was round, intact without evidence of effusion.  No retraction, granulation, drainage, or evidence of cholesteatoma.      Assessment and Plan     ICD-10-CM    1. Mixed conductive and sensorineural hearing loss of both ears  H90.6 Remove Cerumen     Respiratory Aerobic Bacterial Culture with Gram Stain     Gram stain     Yeast culture     ciprofloxacin-dexamethasone (CIPRODEX) 0.3-0.1 % otic suspension   2. OME (otitis media with effusion), right  H65.91 Remove Cerumen     Respiratory Aerobic Bacterial Culture with Gram Stain     Gram stain     Yeast culture     ciprofloxacin-dexamethasone (CIPRODEX) 0.3-0.1 % otic suspension   3. Pulsatile tinnitus, right ear  H93.A1 Remove Cerumen     Respiratory Aerobic Bacterial Culture with Gram Stain     Gram stain     Yeast culture     ciprofloxacin-dexamethasone (CIPRODEX) 0.3-0.1 % otic suspension   4. Otorrhea, right  H92.11 Remove Cerumen     Respiratory Aerobic Bacterial Culture with Gram Stain     Gram stain     Yeast culture     ciprofloxacin-dexamethasone (CIPRODEX) 0.3-0.1 % otic suspension   5. Retained myringotomy tube in right ear  Z96.22 Remove Cerumen     Respiratory Aerobic Bacterial Culture with Gram Stain     Gram stain     Yeast culture     ciprofloxacin-dexamethasone (CIPRODEX) 0.3-0.1 % otic suspension   6. Impacted cerumen of right ear  H61.21       It was my pleasure seeing Deniz Cortes today in clinic.  Unfortunately, he is having active otorrhea from the right ear tube today in office.  I did take a culture and placed some Ciprodex drops in his ear.  We will place him on a course of Ciprodex drops 5 drops twice a day for 10 days.  I will contact him with the culture results if we need to change his therapy at all.  I like to see him back after drop treatment for repeat ear exam and audiometric  assessment.  The patient knows to contact me in the meantime with any problems or concerns.    Sarbjit to follow up with Primary Care provider regarding elevated blood pressure.    Sundar Yang MD  Department of Otolarygology-Head and Neck Surgery  [facility]

## 2021-07-21 ENCOUNTER — OFFICE VISIT (OUTPATIENT)
Dept: OTOLARYNGOLOGY | Facility: CLINIC | Age: 81
End: 2021-07-21
Payer: COMMERCIAL

## 2021-07-21 ENCOUNTER — OFFICE VISIT (OUTPATIENT)
Dept: AUDIOLOGY | Facility: CLINIC | Age: 81
End: 2021-07-21
Payer: COMMERCIAL

## 2021-07-21 VITALS
DIASTOLIC BLOOD PRESSURE: 83 MMHG | WEIGHT: 192 LBS | TEMPERATURE: 98.8 F | BODY MASS INDEX: 30.13 KG/M2 | HEART RATE: 81 BPM | HEIGHT: 67 IN | SYSTOLIC BLOOD PRESSURE: 152 MMHG

## 2021-07-21 DIAGNOSIS — H90.6 MIXED HEARING LOSS, BILATERAL: Primary | ICD-10-CM

## 2021-07-21 DIAGNOSIS — H92.11 OTORRHEA, RIGHT: ICD-10-CM

## 2021-07-21 DIAGNOSIS — H61.21 IMPACTED CERUMEN OF RIGHT EAR: ICD-10-CM

## 2021-07-21 DIAGNOSIS — H65.91 OME (OTITIS MEDIA WITH EFFUSION), RIGHT: ICD-10-CM

## 2021-07-21 DIAGNOSIS — H90.6 MIXED CONDUCTIVE AND SENSORINEURAL HEARING LOSS OF BOTH EARS: Primary | ICD-10-CM

## 2021-07-21 DIAGNOSIS — Z96.22 RETAINED MYRINGOTOMY TUBE IN RIGHT EAR: ICD-10-CM

## 2021-07-21 DIAGNOSIS — H93.A1 PULSATILE TINNITUS, RIGHT EAR: ICD-10-CM

## 2021-07-21 PROCEDURE — 99213 OFFICE O/P EST LOW 20 MIN: CPT | Mod: 25 | Performed by: OTOLARYNGOLOGY

## 2021-07-21 PROCEDURE — 87070 CULTURE OTHR SPECIMN AEROBIC: CPT | Performed by: OTOLARYNGOLOGY

## 2021-07-21 PROCEDURE — 99207 PR NO CHARGE LOS: CPT | Performed by: AUDIOLOGIST

## 2021-07-21 PROCEDURE — 87077 CULTURE AEROBIC IDENTIFY: CPT | Performed by: OTOLARYNGOLOGY

## 2021-07-21 PROCEDURE — 87205 SMEAR GRAM STAIN: CPT | Performed by: OTOLARYNGOLOGY

## 2021-07-21 PROCEDURE — 69210 REMOVE IMPACTED EAR WAX UNI: CPT | Performed by: OTOLARYNGOLOGY

## 2021-07-21 PROCEDURE — 87186 SC STD MICRODIL/AGAR DIL: CPT | Mod: 91 | Performed by: OTOLARYNGOLOGY

## 2021-07-21 PROCEDURE — 87102 FUNGUS ISOLATION CULTURE: CPT | Performed by: OTOLARYNGOLOGY

## 2021-07-21 RX ORDER — CIPROFLOXACIN AND DEXAMETHASONE 3; 1 MG/ML; MG/ML
SUSPENSION/ DROPS AURICULAR (OTIC)
Qty: 10 ML | Refills: 3 | Status: SHIPPED | OUTPATIENT
Start: 2021-07-21 | End: 2022-02-16

## 2021-07-21 ASSESSMENT — MIFFLIN-ST. JEOR: SCORE: 1544.54

## 2021-07-21 NOTE — PATIENT INSTRUCTIONS
Per physician instructions      If you have questions or concerns on any instructions given to you by your provider today or if you need to schedule an appointment, you can reach us at [phone].

## 2021-07-21 NOTE — NURSING NOTE
"Initial BP (!) 152/83 (BP Location: Right arm, Patient Position: Sitting, Cuff Size: Adult Regular)   Pulse 81   Temp 98.8  F (37.1  C) (Tympanic)   Ht 1.702 m (5' 7\")   Wt 87.1 kg (192 lb)   BMI 30.07 kg/m   Estimated body mass index is 30.07 kg/m  as calculated from the following:    Height as of this encounter: 1.702 m (5' 7\").    Weight as of this encounter: 87.1 kg (192 lb). .    Gemma Roman CMA    "

## 2021-07-21 NOTE — PROGRESS NOTES
Patient was being seen by ENT and audiology for a follow up on his bilateral hearing loss. Upon arrival it was noted that there was significant drainage from the right ear and hearing test was postponed. Audiology had planned on discussing hearing aid opportunities with him given his insurance plan. However, patient left before seeing audiology.     Patient will see ENT and audiology for hearing testing on 8/16 it will then be determined if the patient would like to return for a hearing aid consultation.     -Elier Jorgensen CCC-A

## 2021-07-21 NOTE — LETTER
7/21/2021         RE: Deniz Cortes  [address] [facility] [id number]        Dear Colleague,    Thank you for referring your patient, Deniz Cortes, to the [facility]. Please see a copy of my visit note below.    Chief Complaint   Patient presents with     Ent Problem     Check hearing/ears- right ear drainage since tube placement -6-23-21     History of Present Illness  Deniz Cortes is a 81 year old male who presents today for follow-up.  I evaluated the patient on 6/23/2021.  He was having pulsatile tinnitus in the right ear and decreased hearing bilaterally that was worse on the right.  He had obvious middle ear effusion on the right-hand side.  We placed an ear tube on the right.  The patient returns today for follow-up audiometric assessment.    Since last seeing the patient, the patient reports intermittent right ear drainage since placement on 6/23/2021.  He denies any otalgia or bloody otorrhea.  The otorrhea is yellow but not foul-smelling.  He has some imbalance but no significant vertigo.  He has had some noise exposure history in the past.  Aside from his recent ear tube, no other prior history of ear surgery.    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  Call if drainage persistent past 10 days., Disp: 10 mL, Rfl: 3      acetaminophen (TYLENOL) 500 MG tablet, Take 500-1,000 mg by mouth (Patient not taking: Reported on 2021), Disp: , Rfl:      docusate sodium (DSS) 100 MG capsule, Take 100 mg by mouth (Patient not taking: Reported on 2021), Disp: , Rfl:      EPINEPHrine (EPIPEN) 0.3 MG/0.3ML injection, Inject 0.3 mLs into the muscle once as needed for anaphylaxis for 1 dose. (Patient not taking: Reported on 5/10/2021), Disp: 1 each, Rfl: 3    Allergies  Allergies   Allergen Reactions     Augmentin Hives     Bee Venom Anaphylaxis     Lac Bovis GI Disturbance     Pcn [Penicillins] Nausea and Vomiting     vomiting       Social History  Social History     Socioeconomic History     Marital status:      Spouse name: Not on file     Number of children: Not on file     Years of education: Not on file     Highest education level: Not on file   Occupational History     Not on file   Tobacco Use     Smoking status: Former Smoker     Types: Cigarettes     Quit date: 2007     Years since quittin.1     Smokeless tobacco: Never Used   Substance and Sexual Activity     Alcohol use: No     Drug use: No     Sexual activity: Not Currently   Other Topics Concern     Parent/sibling w/ CABG, MI or angioplasty before 65F 55M? No   Social History Narrative     Not on file     Social Determinants of Health     Financial Resource Strain:      Difficulty of Paying Living Expenses:    Food Insecurity:      Worried About Running Out of Food in the Last Year:      Ran Out of Food in the Last Year:    Transportation Needs:      Lack of Transportation (Medical):      Lack of Transportation (Non-Medical):    Physical Activity:      Days of Exercise per Week:      Minutes of Exercise per Session:    Stress:      Feeling of Stress :    Social Connections:      Frequency of Communication with Friends and Family:      Frequency of Social Gatherings with Friends and Family:      Attends [employer] Services:      Active Member of Clubs or  "Organizations:      Attends Club or Organization Meetings:      Marital Status:    Intimate Partner Violence:      Fear of Current or Ex-Partner:      Emotionally Abused:      Physically Abused:      Sexually Abused:        Family History  Family History   Problem Relation Age of Onset     Cardiovascular Father        Review of Systems  As per HPI and PMHx, otherwise 10 system review including the head and neck, constitutional, eyes, respiratory, GI, skin, neurologic, lymphatic, endocrine, and allergy systems is negative.    Physical Exam  BP (!) 152/83 (BP Location: Right arm, Patient Position: Sitting, Cuff Size: Adult Regular)   Pulse 81   Temp 98.8  F (37.1  C) (Tympanic)   Ht 1.702 m (5' 7\")   Wt 87.1 kg (192 lb)   BMI 30.07 kg/m    GENERAL: Patient is a pleasant, cooperative 81 year old male in no acute distress.  HEAD: Normocephalic, atraumatic.  Hair and scalp are normal.  EYES: Pupils are equal, round, reactive to light and accommodation.  Extraocular movements are intact.  The sclera nonicteric without injection.  The extraocular structures are normal.  EARS: See below.  NEUROLOGIC: Cranial nerves II through XII are grossly intact.  Voice is strong.  Facial nerve examination incomplete due to the patient wearing a mask.    CARDIOVASCULAR: Extremities are warm and well-perfused.  No significant peripheral edema.  RESPIRATORY: Patient has nonlabored breathing without cough, wheeze, stridor.  PSYCHIATRIC: Patient is alert and oriented.  Mood and affect appear normal.  SKIN: Warm and dry.  No scalp, face, or neck lesions noted.    Physical Exam and Procedure    EARS: Normal shape and symmetry.  No tenderness when palpating the mastoid or tragal areas bilaterally.  The ears were then examined under the otic binocular microscope to perform cerumen removal.  Otoscopic exam on the right reveals copious otorrhea and impacted cerumen down to the level of the tympanic membrane.  Took a culture of the otorrhea " and sent for microbiologic analysis.  The otorrhea and cerumen were removed with #3 and #5 suction.  There is a Dura-Vent ear tube in the posterior-inferior quadrant.  The middle ear space was suctioned free.  There is no granulation.  No significant retraction or evidence of cholesteatoma.      Attention was turned to the left ear.  Otoscopic exam on the left reveals a minimal amount of cerumen.  The left tympanic membrane was round, intact without evidence of effusion.  No retraction, granulation, drainage, or evidence of cholesteatoma.      Assessment and Plan     ICD-10-CM    1. Mixed conductive and sensorineural hearing loss of both ears  H90.6 Remove Cerumen     Respiratory Aerobic Bacterial Culture with Gram Stain     Gram stain     Yeast culture     ciprofloxacin-dexamethasone (CIPRODEX) 0.3-0.1 % otic suspension   2. OME (otitis media with effusion), right  H65.91 Remove Cerumen     Respiratory Aerobic Bacterial Culture with Gram Stain     Gram stain     Yeast culture     ciprofloxacin-dexamethasone (CIPRODEX) 0.3-0.1 % otic suspension   3. Pulsatile tinnitus, right ear  H93.A1 Remove Cerumen     Respiratory Aerobic Bacterial Culture with Gram Stain     Gram stain     Yeast culture     ciprofloxacin-dexamethasone (CIPRODEX) 0.3-0.1 % otic suspension   4. Otorrhea, right  H92.11 Remove Cerumen     Respiratory Aerobic Bacterial Culture with Gram Stain     Gram stain     Yeast culture     ciprofloxacin-dexamethasone (CIPRODEX) 0.3-0.1 % otic suspension   5. Retained myringotomy tube in right ear  Z96.22 Remove Cerumen     Respiratory Aerobic Bacterial Culture with Gram Stain     Gram stain     Yeast culture     ciprofloxacin-dexamethasone (CIPRODEX) 0.3-0.1 % otic suspension   6. Impacted cerumen of right ear  H61.21       It was my pleasure seeing Deniz EMMANUEL Sebastian today in clinic.  Unfortunately, he is having active otorrhea from the right ear tube today in office.  I did take a culture and placed some  Ciprodex drops in his ear.  We will place him on a course of Ciprodex drops 5 drops twice a day for 10 days.  I will contact him with the culture results if we need to change his therapy at all.  I like to see him back after drop treatment for repeat ear exam and audiometric assessment.  The patient knows to contact me in the meantime with any problems or concerns.    Sarbjit to follow up with Primary Care provider regarding elevated blood pressure.    Sundar Yang MD  Department of Otolarygology-Head and Neck Surgery  [facility]         Again, thank you for allowing me to participate in the care of your patient.        Sincerely,        Sundar Yang MD

## 2021-07-25 LAB
BACTERIA SPEC CULT: ABNORMAL
GRAM STAIN RESULT: ABNORMAL

## 2021-08-16 ENCOUNTER — OFFICE VISIT (OUTPATIENT)
Dept: AUDIOLOGY | Facility: CLINIC | Age: 81
End: 2021-08-16
Payer: COMMERCIAL

## 2021-08-16 ENCOUNTER — OFFICE VISIT (OUTPATIENT)
Dept: OTOLARYNGOLOGY | Facility: CLINIC | Age: 81
End: 2021-08-16
Payer: COMMERCIAL

## 2021-08-16 VITALS
SYSTOLIC BLOOD PRESSURE: 128 MMHG | DIASTOLIC BLOOD PRESSURE: 74 MMHG | TEMPERATURE: 98.5 F | HEART RATE: 78 BPM | BODY MASS INDEX: 30.07 KG/M2 | WEIGHT: 192 LBS

## 2021-08-16 DIAGNOSIS — H90.6 MIXED CONDUCTIVE AND SENSORINEURAL HEARING LOSS OF BOTH EARS: Primary | ICD-10-CM

## 2021-08-16 DIAGNOSIS — H90.6 MIXED HEARING LOSS, BILATERAL: Primary | ICD-10-CM

## 2021-08-16 DIAGNOSIS — H65.91 OME (OTITIS MEDIA WITH EFFUSION), RIGHT: ICD-10-CM

## 2021-08-16 DIAGNOSIS — Z96.22 RETAINED MYRINGOTOMY TUBE IN RIGHT EAR: ICD-10-CM

## 2021-08-16 PROCEDURE — 99207 PR NO CHARGE LOS: CPT | Performed by: AUDIOLOGIST

## 2021-08-16 PROCEDURE — 92557 COMPREHENSIVE HEARING TEST: CPT | Performed by: AUDIOLOGIST

## 2021-08-16 PROCEDURE — 92567 TYMPANOMETRY: CPT | Performed by: AUDIOLOGIST

## 2021-08-16 PROCEDURE — 99213 OFFICE O/P EST LOW 20 MIN: CPT | Performed by: OTOLARYNGOLOGY

## 2021-08-16 ASSESSMENT — PAIN SCALES - GENERAL: PAINLEVEL: NO PAIN (0)

## 2021-08-16 NOTE — PROGRESS NOTES
AUDIOLOGY REPORT:    Patient was referred from ENT by Dr. Yang for audiology evaluation. The patient had a right PE tube placed on 6/23/2021. He was subsequently seen for follow up but testing could not be completed due to drainage in the right ear. The patient returns today and reports that his hearing is a lot better in the right ear but the left ear seems worse. The patient reports that the drainage in the right ear has stopped. He reports that he does not have ear pain or pressure. The patient reports a history of a head injury from falling out of a tree around 50 years ago. He reports a history of loud noise exposure as a , and notes that asymmetrical hearing loss was found on annual hearing tests at work. The patient reports that he has no other prior history of ear problems or ear surgery. The patient was last tested at this clinic on 6/23/2021, and results showed bilateral mixed hearing loss that was better in the right ear in the low frequencies and better in the left ear in the high frequencies.    Testing:    Otoscopy:   Otoscopic exam indicates PE tube present in the right ear with a small amount of drainage deep in the canal around the tube. The left ear is clear.    Tympanograms:    RIGHT: large ear canal volume consistent with patent PE tubes     LEFT:   restricted eardrum mobility (type As)    Thresholds:   Pure Tone Thresholds assessed using conventional audiometry with good reliability from 250-8000 Hz bilaterally using insert earphones and circumaural headphones     RIGHT:  mild to severe mixed hearing loss    LEFT:    moderately severe rising to moderate sloping to severe mixed hearing loss    Speech Reception Threshold:    RIGHT: 40 dB HL    LEFT:   65 dB HL  Results are in agreement with pure tone average.     Word Recognition Score:     RIGHT: 100% at 85 dB HL using NU-6 recorded word list.    LEFT:   40% at 100 dB HL using NU-6 recorded word list.    Discussed results with the  patient. Compared to 6/23/2021, thresholds today are 15-40 dB better at all tested frequencies in the right ear. Left ear thresholds are 10-25 dB better at 250-1000 Hz, 20 dB worse at 1500 Hz, and stable at all other tested frequencies. Word recognition is significantly better today in the right ear. Left ear word recognition is slightly poorer today, but the change is not statistically significant.    Patient was returned to ENT for follow up.     Disha Boucher, CCC-A  Licensed Audiologist #[id number]  8/16/2021

## 2021-08-16 NOTE — NURSING NOTE
"Initial /74 (BP Location: Left arm, Patient Position: Chair, Cuff Size: Adult Large)   Pulse 78   Temp 98.5  F (36.9  C) (Tympanic)   Wt 87.1 kg (192 lb)   BMI 30.07 kg/m   Estimated body mass index is 30.07 kg/m  as calculated from the following:    Height as of 7/21/21: 1.702 m (5' 7\").    Weight as of this encounter: 87.1 kg (192 lb). .    Lisha Levi LPN    "

## 2021-08-16 NOTE — PROGRESS NOTES
Chief Complaint   Patient presents with     Follow Up     recheck ears after using ear drops/repeat audio- patient states no drainage noted     History of Present Illness  Deniz Cortes is a 81 year old male who presents today for follow-up.  I 1st evaluated the patient in June 2021. He was having pulsatile tinnitus in the right ear and decreased hearing bilaterally that was worse on the right.  He had obvious middle ear effusion on the right-hand side.  We placed an ear tube on the right.  I last evaluated the patient on 7/21/2021.  He was having fairly persistent otorrhea.  We cultured his drainage, which grew Proteus, E. coli, and Klebsiella.  All bacteria were sensitive to fluoroquinolones.  He had been placed on Ciprodex drops to the right ear.  The patient returns today for follow-up audiometric assessment.     Since last seeing the patient, the patient reports  resolution of his drainage.  He reports improvement in his right ear hearing.  He denies any otalgia or otorrhea.  He has some imbalance but no significant vertigo.  He has had some noise exposure history in the past.  Aside from his recent ear tube, no other prior history of ear surgery.    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  Call if drainage persistent past 10 days. (Patient not taking: Reported on 8/16/2021), Disp: 10 mL, Rfl: 3     docusate sodium (DSS) 100 MG capsule, Take 100 mg by mouth   (Patient not taking: Reported on 2021), Disp: , Rfl:      EPINEPHrine (EPIPEN) 0.3 MG/0.3ML injection, Inject 0.3 mLs into the muscle once as needed for anaphylaxis for 1 dose. (Patient not taking: Reported on 2021), Disp: 1 each, Rfl: 3    Allergies  Allergies   Allergen Reactions     Augmentin Hives     Bee Venom Anaphylaxis     Lac Bovis GI Disturbance     Pcn [Penicillins] Nausea and Vomiting     vomiting       Social History  Social History     Socioeconomic History     Marital status:      Spouse name: Not on file     Number of children: Not on file     Years of education: Not on file     Highest education level: Not on file   Occupational History     Not on file   Tobacco Use     Smoking status: Former Smoker     Types: Cigarettes     Quit date: 2007     Years since quittin.2     Smokeless tobacco: Never Used   Substance and Sexual Activity     Alcohol use: No     Drug use: No     Sexual activity: Not Currently   Other Topics Concern     Parent/sibling w/ CABG, MI or angioplasty before 65F 55M? No   Social History Narrative     Not on file     Social Determinants of Health     Financial Resource Strain:      Difficulty of Paying Living Expenses:    Food Insecurity:      Worried About Running Out of Food in the Last Year:      Ran Out of Food in the Last Year:    Transportation Needs:      Lack of Transportation (Medical):      Lack of Transportation (Non-Medical):    Physical Activity:      Days of Exercise per Week:      Minutes of Exercise per Session:    Stress:      Feeling of Stress :    Social Connections:      Frequency of Communication with Friends and Family:      Frequency of Social Gatherings with Friends and Family:      Attends [employer] Services:      Active Member of Clubs or Organizations:      Attends Club or Organization Meetings:      Marital Status:    Intimate Partner Violence:      Fear of Current or Ex-Partner:      Emotionally Abused:      Physically Abused:       Sexually Abused:        Family History  Family History   Problem Relation Age of Onset     Cardiovascular Father        Review of Systems  As per HPI and PMHx, otherwise 10 system review including the head and neck, constitutional, eyes, respiratory, GI, skin, neurologic, lymphatic, endocrine, and allergy systems is negative.    Physical Exam  /74 (BP Location: Left arm, Patient Position: Chair, Cuff Size: Adult Large)   Pulse 78   Temp 98.5  F (36.9  C) (Tympanic)   Wt 87.1 kg (192 lb)   BMI 30.07 kg/m    GENERAL: Patient is a pleasant, cooperative 81 year old male in no acute distress.  HEAD: Normocephalic, atraumatic.  Hair and scalp are normal.  EYES: Pupils are equal, round, reactive to light and accommodation.  Extraocular movements are intact.  The sclera nonicteric without injection.  The extraocular structures are normal.  EARS: Normal shape and symmetry.  No tenderness when palpating the mastoid or tragal areas bilaterally.  Otoscopic exam reveals a mostly clear canal.  There is a small amount of moisture around the ear tube but no active otorrhea.  There is a Dura-Vent ear tube in the posterior-inferior quadrant.  The middle ear is well aerated.  No granulation or drainage.  Otoscopic exam the left reveals a clear canal.  The left tympanic membrane is round, intact without evidence of effusion, good landmarks.   NEUROLOGIC: Cranial nerves II through XII are grossly intact.  Voice is strong.  Facial nerve examination incomplete due to the patient wearing a mask.    CARDIOVASCULAR: Extremities are warm and well-perfused.  No significant peripheral edema.  RESPIRATORY: Patient has nonlabored breathing without cough, wheeze, stridor.  PSYCHIATRIC: Patient is alert and oriented.  Mood and affect appear normal.  SKIN: Warm and dry.  No scalp, face, or neck lesions noted.    Audiogram  The patient underwent an audiogram performed today.  My review of the audiogram shows mild sloping to moderately  severe mixed hearing loss in the right ear and moderate sloping to moderately severe mixed hearing loss in the left ear.  Pure-tone average is 45 dB on the right and 58 dB on the left.  Speech reception threshhold is 40 dB on the right and 65 dB on the left.  The patient had 100% word recognition on the right and 40% word recognition on the left.  The patient had a large volume type B tympanogram on the right and a type A shallow tympanogram on the left.    Assessment and Plan     ICD-10-CM    1. Mixed conductive and sensorineural hearing loss of both ears  H90.6 Adult Audiology Referral   2. OME (otitis media with effusion), right  H65.91 Adult Audiology Referral   3. Retained myringotomy tube in right ear  Z96.22 Adult Audiology Referral      It was my pleasure seeing Deniz Cortes today in clinic.  The patient has had resolution of his otorrhea on the right-hand side.  He does have bilateral mixed hearing loss that is worse on the left.  He would be a candidate for trial of amplification in both ears.  We discussed checking with insurance to see what would be covered with regards to hearing aids.  We discussed the hearing aid trial.  And the decision to return the hearing aids if they are not beneficial.  The patient will be medically clear for hearing aid evaluation.    I would recommend the patient return in 6 months for repeat ear tube check.  The patient knows to contact me sooner with any problems with otorrhea or otherwise.    Sundar Yang MD  Department of Otolarygology-Head and Neck Surgery  [facility]

## 2021-08-16 NOTE — LETTER
8/16/2021         RE: Deniz Cortes  [address] [facility] [id number]        Dear Colleague,    Thank you for referring your patient, Deniz Cortes, to the [facility]. Please see a copy of my visit note below.    Chief Complaint   Patient presents with     Follow Up     recheck ears after using ear drops/repeat audio- patient states no drainage noted     History of Present Illness  Deniz Cortes is a 81 year old male who presents today for follow-up.  I 1st evaluated the patient in June 2021. He was having pulsatile tinnitus in the right ear and decreased hearing bilaterally that was worse on the right.  He had obvious middle ear effusion on the right-hand side.  We placed an ear tube on the right.  I last evaluated the patient on 7/21/2021.  He was having fairly persistent otorrhea.  We cultured his drainage, which grew Proteus, E. coli, and Klebsiella.  All bacteria were sensitive to fluoroquinolones.  He had been placed on Ciprodex drops to the right ear.  The patient returns today for follow-up audiometric assessment.     Since last seeing the patient, the patient reports  resolution of his drainage.  He reports improvement in his right ear hearing.  He denies any otalgia or otorrhea.  He has some imbalance but no significant vertigo.  He has had some noise exposure history in the past.  Aside from his recent ear tube, no other prior history of ear surgery.    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  Call if drainage persistent past 10 days. (Patient not taking: Reported on 2021), Disp: 10 mL, Rfl: 3     docusate sodium (DSS) 100 MG capsule, Take 100 mg by mouth  (Patient not taking: Reported on 2021), Disp: , Rfl:      EPINEPHrine (EPIPEN) 0.3 MG/0.3ML injection, Inject 0.3 mLs into the muscle once as needed for anaphylaxis for 1 dose. (Patient not taking: Reported on 2021), Disp: 1 each, Rfl: 3    Allergies  Allergies   Allergen Reactions     Augmentin Hives     Bee Venom Anaphylaxis     Lac Bovis GI Disturbance     Pcn [Penicillins] Nausea and Vomiting     vomiting       Social History  Social History     Socioeconomic History     Marital status:      Spouse name: Not on file     Number of children: Not on file     Years of education: Not on file     Highest education level: Not on file   Occupational History     Not on file   Tobacco Use     Smoking status: Former Smoker     Types: Cigarettes     Quit date: 2007     Years since quittin.2     Smokeless tobacco: Never Used   Substance and Sexual Activity     Alcohol use: No     Drug use: No     Sexual activity: Not Currently   Other Topics Concern     Parent/sibling w/ CABG, MI or angioplasty before 65F 55M? No   Social History Narrative     Not on file     Social Determinants of Health     Financial Resource Strain:      Difficulty of Paying Living Expenses:    Food Insecurity:      Worried About Running Out of Food in the Last Year:      Ran Out of Food in the Last Year:    Transportation Needs:      Lack of Transportation (Medical):      Lack of Transportation (Non-Medical):    Physical Activity:      Days of Exercise per Week:      Minutes of Exercise per Session:    Stress:      Feeling of Stress :    Social Connections:      Frequency of Communication with Friends and Family:      Frequency of Social Gatherings with Friends and  Family:      Attends Protestant Services:      Active Member of Clubs or Organizations:      Attends Club or Organization Meetings:      Marital Status:    Intimate Partner Violence:      Fear of Current or Ex-Partner:      Emotionally Abused:      Physically Abused:      Sexually Abused:        Family History  Family History   Problem Relation Age of Onset     Cardiovascular Father        Review of Systems  As per HPI and PMHx, otherwise 10 system review including the head and neck, constitutional, eyes, respiratory, GI, skin, neurologic, lymphatic, endocrine, and allergy systems is negative.    Physical Exam  /74 (BP Location: Left arm, Patient Position: Chair, Cuff Size: Adult Large)   Pulse 78   Temp 98.5  F (36.9  C) (Tympanic)   Wt 87.1 kg (192 lb)   BMI 30.07 kg/m    GENERAL: Patient is a pleasant, cooperative 81 year old male in no acute distress.  HEAD: Normocephalic, atraumatic.  Hair and scalp are normal.  EYES: Pupils are equal, round, reactive to light and accommodation.  Extraocular movements are intact.  The sclera nonicteric without injection.  The extraocular structures are normal.  EARS: Normal shape and symmetry.  No tenderness when palpating the mastoid or tragal areas bilaterally.  Otoscopic exam reveals a mostly clear canal.  There is a small amount of moisture around the ear tube but no active otorrhea.  There is a Dura-Vent ear tube in the posterior-inferior quadrant.  The middle ear is well aerated.  No granulation or drainage.  Otoscopic exam the left reveals a clear canal.  The left tympanic membrane is round, intact without evidence of effusion, good landmarks.   NEUROLOGIC: Cranial nerves II through XII are grossly intact.  Voice is strong.  Facial nerve examination incomplete due to the patient wearing a mask.    CARDIOVASCULAR: Extremities are warm and well-perfused.  No significant peripheral edema.  RESPIRATORY: Patient has nonlabored breathing without cough, wheeze,  stridor.  PSYCHIATRIC: Patient is alert and oriented.  Mood and affect appear normal.  SKIN: Warm and dry.  No scalp, face, or neck lesions noted.    Audiogram  The patient underwent an audiogram performed today.  My review of the audiogram shows mild sloping to moderately severe mixed hearing loss in the right ear and moderate sloping to moderately severe mixed hearing loss in the left ear.  Pure-tone average is 45 dB on the right and 58 dB on the left.  Speech reception threshhold is 40 dB on the right and 65 dB on the left.  The patient had 100% word recognition on the right and 40% word recognition on the left.  The patient had a large volume type B tympanogram on the right and a type A shallow tympanogram on the left.    Assessment and Plan     ICD-10-CM    1. Mixed conductive and sensorineural hearing loss of both ears  H90.6 Adult Audiology Referral   2. OME (otitis media with effusion), right  H65.91 Adult Audiology Referral   3. Retained myringotomy tube in right ear  Z96.22 Adult Audiology Referral      It was my pleasure seeing Deniz Cortes today in clinic.  The patient has had resolution of his otorrhea on the right-hand side.  He does have bilateral mixed hearing loss that is worse on the left.  He would be a candidate for trial of amplification in both ears.  We discussed checking with insurance to see what would be covered with regards to hearing aids.  We discussed the hearing aid trial.  And the decision to return the hearing aids if they are not beneficial.  The patient will be medically clear for hearing aid evaluation.    I would recommend the patient return in 6 months for repeat ear tube check.  The patient knows to contact me sooner with any problems with otorrhea or otherwise.    Sundar Yang MD  Department of Otolarygology-Head and Neck Surgery  [facility]         Again, thank you for allowing me to participate in the care of your patient.        Sincerely,        Sundar LILLY  MD Cel[place]

## 2021-08-18 LAB — BACTERIA SPEC CULT: NO GROWTH

## 2022-01-14 NOTE — PROGRESS NOTES
"    Assessment & Plan     Excessive cerumen in ear canal, left  Removed successfully with lighted speculum.   - REMOVE IMPACTED CERUMEN    Malignant neoplasm of urinary bladder, unspecified site (H)  Wears a leg bag. Followed with urology.            BMI:   Estimated body mass index is 28.95 kg/m  as calculated from the following:    Height as of this encounter: 1.721 m (5' 7.75\").    Weight as of this encounter: 85.7 kg (189 lb).           Return in about 4 weeks (around 6/7/2021) for annual wellness exam.    Karolina Fernandez, EDGAR CNP  [facility]    Ehsan Schaffer is a 81 year old who presents for the following health issues  accompanied by his self :    HPI     L ear plugged had R ear cleaned 5-6-21 but could not get the L ear completely cleaned.   Needs form signed to show that he got this completed. Getting fitted for hearing aids.   No other concerns today. Denies ear pain.     Positive history of bladder cancer. He follows with urology and has a leg bag.     Review of Systems   Constitutional, HEENT, cardiovascular, pulmonary, gi and gu systems are negative, except as otherwise noted.      Objective    /64   Pulse 101   Temp 97.9  F (36.6  C) (Tympanic)   Resp 18   Ht 1.721 m (5' 7.75\")   Wt 85.7 kg (189 lb)   SpO2 94%   BMI 28.95 kg/m    Body mass index is 28.95 kg/m .  Physical Exam   GENERAL: healthy, alert and no distress  HENT: Left TM mildly occluded with soft cerumen. Removed with lighted curette. Tolerated well. Following procedure Ear canals and TM's normal, nose and mouth without ulcers or lesions                " ELYSE Lockett  Post Operative Discharge Summary      Patient: Kimberly Erazo      MR#:[id number]  Admission  Diagnosis: ruptured L ectopic pregnancy  Discharge Diagnosis: Same    Date of Admission: 1/13/2022  Date of Discharge:  1/14/2022    Procedures: laparoscopic LSO, evacuation of hemoperitoneum             Service:  Gynecology    Hospital Course:  Patient underwent above surgery and remained in the hospital for 1 day.  During that time she remained afebrile and hemodynamically stable.  On the day of discharge, she was eating, ambulating and voiding without difficulty.      Labs:    Lab Results (last 24 hours)     Procedure Component Value Units Date/Time    Tissue Pathology Exam [[id number]] Collected: 01/13/22 2311    Specimen: Tissue from Fallopian Tube, Left Updated: 01/14/22 0830    Potassium [[id number]]  (Abnormal) Collected: 01/14/22 0538    Specimen: Blood Updated: 01/14/22 0641     Potassium 3.3 mmol/L     CBC (No Diff) [[id number]]  (Abnormal) Collected: 01/14/22 0538    Specimen: Blood Updated: 01/14/22 0628     WBC 9.74 10*3/mm3      RBC 3.71 10*6/mm3      Hemoglobin 10.1 g/dL      Hematocrit 31.6 %      MCV 85.2 fL      MCH 27.2 pg      MCHC 32.0 g/dL      RDW 14.1 %      RDW-SD 43.7 fl      MPV 11.4 fL      Platelets 187 10*3/mm3     COVID PRE-OP / PRE-PROCEDURE SCREENING ORDER (NO ISOLATION) - Swab, Nasopharynx [[id number]]  (Normal) Collected: 01/13/22 2117    Specimen: Swab from Nasopharynx Updated: 01/13/22 2141    Narrative:      The following orders were created for panel order COVID PRE-OP / PRE-PROCEDURE SCREENING ORDER (NO ISOLATION) - Swab, Nasopharynx.  Procedure                               Abnormality         Status                     ---------                               -----------         ------                     COVID-19 ABBOTT IN-HOUS...[[id number]]  Normal              Final result                 Please view results for these tests on the individual orders.    COVID-19,  MARIA C IN-HOUSE,NASAL Swab (NO TRANSPORT MEDIA) 2 HR TAT - Swab, Nasopharynx [[id number]]  (Normal) Collected: 01/13/22 2117    Specimen: Swab from Nasopharynx Updated: 01/13/22 2141     COVID19 Presumptive Negative    Narrative:      Fact sheet for providers: https://www.fda.gov/media/496814/download     Fact sheet for patients: https://www.fda.gov/media/552660/download    Test performed by PCR.  If inconsistent with clinical signs and symptoms patient should be tested with different authorized molecular test.    [place] Draw [[id number]] Collected: 01/13/22 1652    Specimen: Blood Updated: 01/13/22 2100    Narrative:      The following orders were created for panel order [place] Draw.  Procedure                               Abnormality         Status                     ---------                               -----------         ------                     Green Top (Gel)[[id number]]                                  Final result               Lavender Top[[id number]]                                     Final result               Gold Top - SST[[id number]]                                   Final result               Vega Top[[id number]]                                         Final result               Light Blue Top[[id number]]                                   Final result                 Please view results for these tests on the individual orders.    Vega Top [[id number]] Collected: 01/13/22 1652    Specimen: Blood Updated: 01/13/22 2100     Extra Tube Hold for add-ons.     Comment: Auto resulted.       Urinalysis With Microscopic If Indicated (No Culture) - Urine, Clean Catch [[id number]]  (Abnormal) Collected: 01/13/22 1848    Specimen: Urine, Clean Catch Updated: 01/13/22 1858     Color, UA Yellow     Appearance, UA Cloudy     pH, UA 5.5     Specific Gravity, UA 1.013     Glucose, UA Negative     Ketones, UA 15 mg/dL (1+)     Bilirubin, UA Negative     Blood, UA Large (3+)     Protein, UA Negative     Leuk Esterase,  UA Negative     Nitrite, UA Negative     Urobilinogen, UA 0.2 E.U./dL    Urinalysis, Microscopic Only - Urine, Clean Catch [[id number]]  (Abnormal) Collected: 01/13/22 1848    Specimen: Urine, Clean Catch Updated: 01/13/22 1858     RBC, UA 31-50 /HPF      WBC, UA 0-2 /HPF      Bacteria, UA None Seen /HPF      Squamous Epithelial Cells, UA 0-2 /HPF      Hyaline Casts, UA 0-6 /LPF      Methodology Automated Microscopy    Green Top (Gel) [[id number]] Collected: 01/13/22 1652    Specimen: Blood Updated: 01/13/22 1800     Extra Tube Hold for add-ons.     Comment: Auto resulted.       Gold Top - SST [[id number]] Collected: 01/13/22 1652    Specimen: Blood Updated: 01/13/22 1800     Extra Tube Hold for add-ons.     Comment: Auto resulted.       Lavender Top [[id number]] Collected: 01/13/22 1652    Specimen: Blood Updated: 01/13/22 1800     Extra Tube hold for add-on     Comment: Auto resulted       Light Blue Top [[id number]] Collected: 01/13/22 1652    Specimen: Blood Updated: 01/13/22 1800     Extra Tube hold for add-on     Comment: Auto resulted       hCG, Quantitative, Pregnancy [[id number]] Collected: 01/13/22 1652    Specimen: Blood Updated: 01/13/22 1729     HCG Quantitative 2,672.00 mIU/mL     Narrative:      HCG Ranges by Gestational Age    Females - non-pregnant premenopausal   </= 1mIU/mL HCG  Females - postmenopausal               </= 7mIU/mL HCG    3 Weeks         5.8 -    71.2 mIU/mL  4 Weeks         9.5 -     750 mIU/mL  5 Weeks         217 -   7,138 mIU/mL  6 Weeks         158 -  31,795 mIU/mL  7 Weeks       3,697 - 163,563 mIU/mL  8 Weeks      32,065 - 149,571 mIU/mL  9 Weeks      63,803 - 151,410 mIU/mL  10 Weeks     46,509 - 186,977 mIU/mL  12 Weeks     27,832 - 210,612 mIU/mL  14 Weeks     13,950 -  62,530 mIU/mL  15 Weeks     12,039 -  70,971 mIU/mL  16 Weeks      9,040 -  56,451 mIU/mL  17 Weeks      8,175 -  55,868 mIU/mL  18 Weeks      8,099 -  58,176 mIU/mL  Results may be falsely decreased if patient  taking Biotin.      Comprehensive Metabolic Panel [[id number]] Collected: 01/13/22 1652    Specimen: Blood Updated: 01/13/22 1723     Glucose 92 mg/dL      BUN 9 mg/dL      Creatinine 0.58 mg/dL      Sodium 139 mmol/L      Potassium 4.1 mmol/L      Chloride 105 mmol/L      CO2 23.0 mmol/L      Calcium 9.3 mg/dL      Total Protein 7.2 g/dL      Albumin 4.50 g/dL      ALT (SGPT) 16 U/L      AST (SGOT) 16 U/L      Alkaline Phosphatase 77 U/L      Total Bilirubin 0.7 mg/dL      eGFR Non African Amer 116 mL/min/1.73      eGFR  African Amer 141 mL/min/1.73      Globulin 2.7 gm/dL      Comment: Calculated Result        A/G Ratio 1.7 g/dL      BUN/Creatinine Ratio 15.5     Anion Gap 11.0 mmol/L     Narrative:      GFR Normal >60  Chronic Kidney Disease <60  Kidney Failure <15      Lipase [[id number]]  (Normal) Collected: 01/13/22 1652    Specimen: Blood Updated: 01/13/22 1723     Lipase 22 U/L     CBC & Differential [[id number]]  (Abnormal) Collected: 01/13/22 1652    Specimen: Blood Updated: 01/13/22 1705    Narrative:      The following orders were created for panel order CBC & Differential.  Procedure                               Abnormality         Status                     ---------                               -----------         ------                     CBC Auto Differential[[id number]]        Abnormal            Final result                 Please view results for these tests on the individual orders.    CBC Auto Differential [[id number]]  (Abnormal) Collected: 01/13/22 1652    Specimen: Blood Updated: 01/13/22 1705     WBC 7.70 10*3/mm3      RBC 3.71 10*6/mm3      Hemoglobin 10.2 g/dL      Hematocrit 31.2 %      MCV 84.1 fL      MCH 27.5 pg      MCHC 32.7 g/dL      RDW 14.2 %      RDW-SD 43.8 fl      MPV 11.1 fL      Platelets 210 10*3/mm3      Neutrophil % 69.5 %      Lymphocyte % 22.1 %      Monocyte % 6.6 %      Eosinophil % 1.3 %      Basophil % 0.4 %      Immature Grans % 0.1 %      Neutrophils, Absolute 5.35  10*3/mm3      Lymphocytes, Absolute 1.70 10*3/mm3      Monocytes, Absolute 0.51 10*3/mm3      Eosinophils, Absolute 0.10 10*3/mm3      Basophils, Absolute 0.03 10*3/mm3      Immature Grans, Absolute 0.01 10*3/mm3      nRBC 0.0 /100 WBC           Discharge Medications     Discharge Medications      New Medications      Instructions Start Date   FeroSul 325 (65 FE) MG tablet  Generic drug: ferrous sulfate   325 mg, Oral, Daily With Breakfast      HYDROcodone-acetaminophen 5-325 MG per tablet  Commonly known as: NORCO   1 tablet, Oral, Every 4 Hours PRN      ibuprofen 600 MG tablet  Commonly known as: ADVIL,MOTRIN   600 mg, Oral, Every 6 Hours PRN      Stool Softener 100 MG capsule  Generic drug: docusate sodium   100 mg, Oral, 2 Times Daily         Continue These Medications      Instructions Start Date   IUD'S IU   Intrauterine, X 4 years              Discharge Disposition:  To Home    Discharge Condition:  Stable    Discharge Diet: regular    Activity at Discharge: pelvic rest    Follow-up Appointments  Future Appointments   Date Time Provider Department Center   1/27/2022 10:00 AM Ubaldo Olivarez PA [facility] Sarah E Borders, MD  01/14/22  14:12 EST   ELYSE Lockett  Post Operative Discharge Summary      Patient: Kimberly Erazo      MR#:[id number]  Admission  Diagnosis: @DX@  Discharge Diagnosis: Same    Date of Admission: 1/13/2022  Date of Discharge:  1/14/2022    Procedures:             Service:  Gynecology    Hospital Course:  Patient underwent  and remained in the hospital for 0 days.  During that time she remained afebrile and hemodynamically stable.  On the day of discharge, she was eating, ambulating and voiding without difficulty.      Labs:    Lab Results (last 24 hours)     Procedure Component Value Units Date/Time    Tissue Pathology Exam [[id number]] Collected: 01/13/22 2311    Specimen: Tissue from Fallopian Tube, Left Updated: 01/14/22 0830    Potassium [[id number]]  (Abnormal)  Collected: 01/14/22 0538    Specimen: Blood Updated: 01/14/22 0641     Potassium 3.3 mmol/L     CBC (No Diff) [[id number]]  (Abnormal) Collected: 01/14/22 0538    Specimen: Blood Updated: 01/14/22 0628     WBC 9.74 10*3/mm3      RBC 3.71 10*6/mm3      Hemoglobin 10.1 g/dL      Hematocrit 31.6 %      MCV 85.2 fL      MCH 27.2 pg      MCHC 32.0 g/dL      RDW 14.1 %      RDW-SD 43.7 fl      MPV 11.4 fL      Platelets 187 10*3/mm3     COVID PRE-OP / PRE-PROCEDURE SCREENING ORDER (NO ISOLATION) - Swab, Nasopharynx [[id number]]  (Normal) Collected: 01/13/22 2117    Specimen: Swab from Nasopharynx Updated: 01/13/22 2141    Narrative:      The following orders were created for panel order COVID PRE-OP / PRE-PROCEDURE SCREENING ORDER (NO ISOLATION) - Swab, Nasopharynx.  Procedure                               Abnormality         Status                     ---------                               -----------         ------                     COVID-19, ABBOTT IN-HOUS...[[id number]]  Normal              Final result                 Please view results for these tests on the individual orders.    COVID-19, ABBOTT IN-HOUSE,NASAL Swab (NO TRANSPORT MEDIA) 2 HR TAT - Swab, Nasopharynx [[id number]]  (Normal) Collected: 01/13/22 2117    Specimen: Swab from Nasopharynx Updated: 01/13/22 2141     COVID19 Presumptive Negative    Narrative:      Fact sheet for providers: https://www.fda.gov/media/618886/download     Fact sheet for patients: https://www.fda.gov/media/185787/download    Test performed by PCR.  If inconsistent with clinical signs and symptoms patient should be tested with different authorized molecular test.    [place] Draw [[id number]] Collected: 01/13/22 1652    Specimen: Blood Updated: 01/13/22 2100    Narrative:      The following orders were created for panel order [place] Draw.  Procedure                               Abnormality         Status                     ---------                               -----------          ------                     Green Top (Gel)[[id number]]                                  Final result               Lavender Top[[id number]]                                     Final result               Gold Top - SST[[id number]]                                   Final result               Vega Top[[id number]]                                         Final result               Light Blue Top[[id number]]                                   Final result                 Please view results for these tests on the individual orders.    Vega Top [[id number]] Collected: 01/13/22 1652    Specimen: Blood Updated: 01/13/22 2100     Extra Tube Hold for add-ons.     Comment: Auto resulted.       Urinalysis With Microscopic If Indicated (No Culture) - Urine, Clean Catch [[id number]]  (Abnormal) Collected: 01/13/22 1848    Specimen: Urine, Clean Catch Updated: 01/13/22 1858     Color, UA Yellow     Appearance, UA Cloudy     pH, UA 5.5     Specific Gravity, UA 1.013     Glucose, UA Negative     Ketones, UA 15 mg/dL (1+)     Bilirubin, UA Negative     Blood, UA Large (3+)     Protein, UA Negative     Leuk Esterase, UA Negative     Nitrite, UA Negative     Urobilinogen, UA 0.2 E.U./dL    Urinalysis, Microscopic Only - Urine, Clean Catch [[id number]]  (Abnormal) Collected: 01/13/22 1848    Specimen: Urine, Clean Catch Updated: 01/13/22 1858     RBC, UA 31-50 /HPF      WBC, UA 0-2 /HPF      Bacteria, UA None Seen /HPF      Squamous Epithelial Cells, UA 0-2 /HPF      Hyaline Casts, UA 0-6 /LPF      Methodology Automated Microscopy    Green Top (Gel) [[id number]] Collected: 01/13/22 1652    Specimen: Blood Updated: 01/13/22 1800     Extra Tube Hold for add-ons.     Comment: Auto resulted.       Gold Top - SST [[id number]] Collected: 01/13/22 1652    Specimen: Blood Updated: 01/13/22 1800     Extra Tube Hold for add-ons.     Comment: Auto resulted.       Lavender Top [[id number]] Collected: 01/13/22 1652    Specimen: Blood Updated: 01/13/22 1800      Extra Tube hold for add-on     Comment: Auto resulted       Light Blue Top [[id number]] Collected: 01/13/22 1652    Specimen: Blood Updated: 01/13/22 1800     Extra Tube hold for add-on     Comment: Auto resulted       hCG, Quantitative, Pregnancy [[id number]] Collected: 01/13/22 1652    Specimen: Blood Updated: 01/13/22 1729     HCG Quantitative 2,672.00 mIU/mL     Narrative:      HCG Ranges by Gestational Age    Females - non-pregnant premenopausal   </= 1mIU/mL HCG  Females - postmenopausal               </= 7mIU/mL HCG    3 Weeks         5.8 -    71.2 mIU/mL  4 Weeks         9.5 -     750 mIU/mL  5 Weeks         217 -   7,138 mIU/mL  6 Weeks         158 -  31,795 mIU/mL  7 Weeks       3,697 - 163,563 mIU/mL  8 Weeks      32,065 - 149,571 mIU/mL  9 Weeks      63,803 - 151,410 mIU/mL  10 Weeks     46,509 - 186,977 mIU/mL  12 Weeks     27,832 - 210,612 mIU/mL  14 Weeks     13,950 -  62,530 mIU/mL  15 Weeks     12,039 -  70,971 mIU/mL  16 Weeks      9,040 -  56,451 mIU/mL  17 Weeks      8,175 -  55,868 mIU/mL  18 Weeks      8,099 -  58,176 mIU/mL  Results may be falsely decreased if patient taking Biotin.      Comprehensive Metabolic Panel [[id number]] Collected: 01/13/22 1652    Specimen: Blood Updated: 01/13/22 1723     Glucose 92 mg/dL      BUN 9 mg/dL      Creatinine 0.58 mg/dL      Sodium 139 mmol/L      Potassium 4.1 mmol/L      Chloride 105 mmol/L      CO2 23.0 mmol/L      Calcium 9.3 mg/dL      Total Protein 7.2 g/dL      Albumin 4.50 g/dL      ALT (SGPT) 16 U/L      AST (SGOT) 16 U/L      Alkaline Phosphatase 77 U/L      Total Bilirubin 0.7 mg/dL      eGFR Non African Amer 116 mL/min/1.73      eGFR  African Amer 141 mL/min/1.73      Globulin 2.7 gm/dL      Comment: Calculated Result        A/G Ratio 1.7 g/dL      BUN/Creatinine Ratio 15.5     Anion Gap 11.0 mmol/L     Narrative:      GFR Normal >60  Chronic Kidney Disease <60  Kidney Failure <15      Lipase [[id number]]  (Normal) Collected: 01/13/22 7935     Specimen: Blood Updated: 01/13/22 1723     Lipase 22 U/L     CBC & Differential [[id number]]  (Abnormal) Collected: 01/13/22 1652    Specimen: Blood Updated: 01/13/22 1705    Narrative:      The following orders were created for panel order CBC & Differential.  Procedure                               Abnormality         Status                     ---------                               -----------         ------                     CBC Auto Differential[[id number]]        Abnormal            Final result                 Please view results for these tests on the individual orders.    CBC Auto Differential [[id number]]  (Abnormal) Collected: 01/13/22 1652    Specimen: Blood Updated: 01/13/22 1705     WBC 7.70 10*3/mm3      RBC 3.71 10*6/mm3      Hemoglobin 10.2 g/dL      Hematocrit 31.2 %      MCV 84.1 fL      MCH 27.5 pg      MCHC 32.7 g/dL      RDW 14.2 %      RDW-SD 43.8 fl      MPV 11.1 fL      Platelets 210 10*3/mm3      Neutrophil % 69.5 %      Lymphocyte % 22.1 %      Monocyte % 6.6 %      Eosinophil % 1.3 %      Basophil % 0.4 %      Immature Grans % 0.1 %      Neutrophils, Absolute 5.35 10*3/mm3      Lymphocytes, Absolute 1.70 10*3/mm3      Monocytes, Absolute 0.51 10*3/mm3      Eosinophils, Absolute 0.10 10*3/mm3      Basophils, Absolute 0.03 10*3/mm3      Immature Grans, Absolute 0.01 10*3/mm3      nRBC 0.0 /100 WBC           Discharge Medications     Discharge Medications      New Medications      Instructions Start Date   FeroSul 325 (65 FE) MG tablet  Generic drug: ferrous sulfate   325 mg, Oral, Daily With Breakfast      HYDROcodone-acetaminophen 5-325 MG per tablet  Commonly known as: NORCO   1 tablet, Oral, Every 4 Hours PRN      ibuprofen 600 MG tablet  Commonly known as: ADVIL,MOTRIN   600 mg, Oral, Every 6 Hours PRN      Stool Softener 100 MG capsule  Generic drug: docusate sodium   100 mg, Oral, 2 Times Daily         Continue These Medications      Instructions Start Date   IUD'S IU   Intrauterine,  X 4 years              Discharge Disposition:  To Home    Discharge Condition:  Stable    Discharge Diet: regular    Activity at Discharge: pelvic rest    Follow-up Appointments  Future Appointments   Date Time Provider Department Center   1/27/2022 10:00 AM Ubaldo Olivarez PA [facility] Sarah E Borders, MD  01/14/22  14:12 EST

## 2022-02-15 NOTE — PROGRESS NOTES
Chief Complaint   Patient presents with     Ent Problem     Follow up ears/hearing/right tube  placed 6-23-21     History of Present Illness  Deniz Cortes is a 82 year old male who presents today for follow-up. The patient has a history of pulsatile tinnitus in the right ear and decreased hearing bilaterally that was worse on the right.  He had obvious middle ear effusion on the right-hand side and we placed an ear tube on the right on 6/23/2021.  He was seen again for persistent otorrhea on the right, culture of which grew Proteus, E. coli, and Klebsiella.  All bacteria were sensitive to fluoroquinolones.  He was been placed on Ciprodex drops to the right ear and was seen for follow-up in August 2021 with resolution of his drainage.  He continued to have bilateral mixed hearing loss and we discussed amplification.  The patient returns today for tube follow-up and audiometric assessment.    Since last seeing the patient, the patient reports that his ears are stable.  He feels like his hearing is at baseline on the right-hand side. He denies any otalgia or otorrhea.  He has some imbalance but no significant vertigo.  He has had some noise exposure history in the past.  Aside from his recent ear tube, no other prior history of ear surgery.    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     CARDIOVASCULAR SCREENING; LDL GOAL LESS THAN 160     Advanced directives, counseling/discussion     Cataract     Malignant neoplasm of urinary bladder, unspecified site (H)     Current Medications    Current Outpatient Medications:      betamethasone dipropionate (DIPROSONE) 0.05 % external lotion, Apply topically to affected area(s) 2 times daily., Disp: , Rfl:      docusate sodium (DSS) 100 MG capsule, Take 100 mg by mouth  (Patient not taking: Reported on 8/16/2021), Disp: , Rfl:      EPINEPHrine (EPIPEN) 0.3 MG/0.3ML injection, Inject 0.3 mLs into the muscle once as  "needed for anaphylaxis for 1 dose. (Patient not taking: Reported on 2021), Disp: 1 each, Rfl: 3    Allergies  Allergies   Allergen Reactions     Augmentin Hives     Bee Venom Anaphylaxis     Lac Bovis GI Disturbance     Pcn [Penicillins] Nausea and Vomiting     vomiting       Social History  Social History     Socioeconomic History     Marital status:      Spouse name: Not on file     Number of children: Not on file     Years of education: Not on file     Highest education level: Not on file   Occupational History     Not on file   Tobacco Use     Smoking status: Former Smoker     Types: Cigarettes     Quit date: 2007     Years since quittin.7     Smokeless tobacco: Never Used   Substance and Sexual Activity     Alcohol use: No     Drug use: No     Sexual activity: Not Currently   Other Topics Concern     Parent/sibling w/ CABG, MI or angioplasty before 65F 55M? No   Social History Narrative     Not on file     Social Determinants of Health     Financial Resource Strain: Not on file   Food Insecurity: Not on file   Transportation Needs: Not on file   Physical Activity: Not on file   Stress: Not on file   Social Connections: Not on file   Intimate Partner Violence: Not on file   Housing Stability: Not on file       Family History  Family History   Problem Relation Age of Onset     Cardiovascular Father        Review of Systems  As per HPI and PMHx, otherwise 10 system review including the head and neck, constitutional, eyes, respiratory, GI, skin, neurologic, lymphatic, endocrine, and allergy systems is negative.    Physical Exam  BP (!) 180/84 (Patient Position: Sitting, Cuff Size: Adult Regular)   Pulse 59   Temp 98.1  F (36.7  C) (Tympanic)   Ht 1.702 m (5' 7\")   Wt 90.7 kg (200 lb)   BMI 31.32 kg/m    GENERAL: Patient is a pleasant, cooperative 82 year old male in no acute distress.  HEAD: Normocephalic, atraumatic.  Hair and scalp are normal.  EYES: Pupils are equal, round, reactive to " light and accommodation.  Extraocular movements are intact.  The sclera nonicteric without injection.  The extraocular structures are normal.  EARS: See below.    NEUROLOGIC: Cranial nerves II through XII are grossly intact.  Voice is strong.  Facial nerve examination incomplete due to the patient wearing a mask.    CARDIOVASCULAR: Extremities are warm and well-perfused.  No significant peripheral edema.  RESPIRATORY: Patient has nonlabored breathing without cough, wheeze, stridor.  PSYCHIATRIC: Patient is alert and oriented.  Mood and affect appear normal.  SKIN: Warm and dry.  No scalp, face, or neck lesions noted.    Physical Exam and Procedure    EARS: Normal shape and symmetry.  No tenderness when palpating the mastoid or tragal areas bilaterally.  The ears were then examined under the otic binocular microscope to perform cerumen removal.  Otoscopic exam on the right reveals impacted cerumen and extruded ear tube down to the level of the tympanic membrane.  The cerumen and extruded ear tube were removed with a #7 suction and alligator forceps.  The right tympanic membrane was round, intact without evidence of effusion.  No retraction, granulation, drainage, or evidence of cholesteatoma.      Attention was turned to the left ear.  Otoscopic exam on the left reveals impacted cerumen down to the level of the tympanic membrane.  The cerumen was removed with #7 suction.  The left tympanic membrane was round, intact without evidence of effusion.  No retraction, granulation, drainage, or evidence of cholesteatoma.      Audiogram  The patient underwent an audiogram performed today.  My review of the audiogram shows borderline normal hearing to 1000 Hz sloping to mild sloping to moderately severe sensorineural hearing loss in the right ear and moderate sloping to severe mixed hearing loss in the left ear.  Pure-tone average is 43 dB on the right and 63 dB on the left.  Speech reception threshold is 30 dB on the right  and 50 dB on the left.  The patient had 92% word recognition on the right and 68% word recognition on the left.  The patient had a type A tympanogram on the right and a type A shallow tympanogram on the left.    Assessment and Plan     ICD-10-CM    1. 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 Mixed conductive and sensorineural hearing loss of left ear with restricted hearing of right ear  H90.A32 Adult Audiology Referral     Microscopy, Binocular   3. Bilateral impacted cerumen  H61.23 Adult Audiology Referral     Microscopy, Binocular   4. Normal ear exam  Z01.10 Adult Audiology Referral     Microscopy, Binocular      It was my pleasure seeing Deniz Cortes today in clinic.  The patient's right ear tube has extruded and his eardrum is healed well.  He has no middle ear effusion and his hearing on the right has returned to baseline.  I would recommend observation at this time.  The patient is not currently interested in hearing aids for either ear.  He would be a candidate for amplification in both ears if he would like to pursue amplification.  We can see the patient back as needed for ear cleaning or any other hearing issues.  Patient knows to contact with any problems or concerns.    Sarbjit to follow up with Primary Care provider regarding elevated blood pressure.    Sundar Yang MD  Department of Otolaryngology-Head and Neck Surgery  [facility]

## 2022-02-16 ENCOUNTER — OFFICE VISIT (OUTPATIENT)
Dept: AUDIOLOGY | Facility: CLINIC | Age: 82
End: 2022-02-16
Payer: COMMERCIAL

## 2022-02-16 ENCOUNTER — OFFICE VISIT (OUTPATIENT)
Dept: OTOLARYNGOLOGY | Facility: CLINIC | Age: 82
End: 2022-02-16
Payer: COMMERCIAL

## 2022-02-16 VITALS
SYSTOLIC BLOOD PRESSURE: 180 MMHG | WEIGHT: 200 LBS | TEMPERATURE: 98.1 F | DIASTOLIC BLOOD PRESSURE: 84 MMHG | HEART RATE: 59 BPM | BODY MASS INDEX: 31.39 KG/M2 | HEIGHT: 67 IN

## 2022-02-16 DIAGNOSIS — H90.A32 MIXED CONDUCTIVE AND SENSORINEURAL HEARING LOSS OF LEFT EAR WITH RESTRICTED HEARING OF RIGHT EAR: ICD-10-CM

## 2022-02-16 DIAGNOSIS — H90.A21 SENSORINEURAL HEARING LOSS (SNHL) OF RIGHT EAR WITH RESTRICTED HEARING OF LEFT EAR: Primary | ICD-10-CM

## 2022-02-16 DIAGNOSIS — H61.23 BILATERAL IMPACTED CERUMEN: ICD-10-CM

## 2022-02-16 DIAGNOSIS — Z01.10 NORMAL EAR EXAM: ICD-10-CM

## 2022-02-16 PROCEDURE — 92504 EAR MICROSCOPY EXAMINATION: CPT | Performed by: OTOLARYNGOLOGY

## 2022-02-16 PROCEDURE — 92557 COMPREHENSIVE HEARING TEST: CPT | Performed by: AUDIOLOGIST

## 2022-02-16 PROCEDURE — 99207 PR NO CHARGE LOS: CPT | Performed by: AUDIOLOGIST

## 2022-02-16 PROCEDURE — 99213 OFFICE O/P EST LOW 20 MIN: CPT | Mod: 25 | Performed by: OTOLARYNGOLOGY

## 2022-02-16 PROCEDURE — 92567 TYMPANOMETRY: CPT | Performed by: AUDIOLOGIST

## 2022-02-16 NOTE — LETTER
2/16/2022         RE: Deniz Cortes  [id number] [address] [facility] [address] [facility] [id number]        Dear Colleague,    Thank you for referring your patient, Deniz Cortes, to the [facility]. Please see a copy of my visit note below.    Chief Complaint   Patient presents with     Ent Problem     Follow up ears/hearing/right tube  placed 6-23-21     History of Present Illness  Deniz Cortes is a 82 year old male who presents today for follow-up. The patient has a history of pulsatile tinnitus in the right ear and decreased hearing bilaterally that was worse on the right.  He had obvious middle ear effusion on the right-hand side and we placed an ear tube on the right on 6/23/2021.  He was seen again for persistent otorrhea on the right, culture of which grew Proteus, E. coli, and Klebsiella.  All bacteria were sensitive to fluoroquinolones.  He was been placed on Ciprodex drops to the right ear and was seen for follow-up in August 2021 with resolution of his drainage.  He continued to have bilateral mixed hearing loss and we discussed amplification.  The patient returns today for tube follow-up and audiometric assessment.    Since last seeing the patient, the patient reports that his ears are stable.  He feels like his hearing is at baseline on the right-hand side. He denies any otalgia or otorrhea.  He has some imbalance but no significant vertigo.  He has had some noise exposure history in the past.  Aside from his recent ear tube, no other prior history of ear surgery.    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     CARDIOVASCULAR SCREENING; LDL GOAL LESS THAN 160     Advanced directives, counseling/discussion     Cataract     Malignant neoplasm of urinary bladder, unspecified site (H)     Current Medications    Current Outpatient Medications:      betamethasone dipropionate (DIPROSONE) 0.05 % external lotion, Apply  topically to affected area(s) 2 times daily., Disp: , Rfl:      docusate sodium (DSS) 100 MG capsule, Take 100 mg by mouth  (Patient not taking: Reported on 2021), Disp: , Rfl:      EPINEPHrine (EPIPEN) 0.3 MG/0.3ML injection, Inject 0.3 mLs into the muscle once as needed for anaphylaxis for 1 dose. (Patient not taking: Reported on 2021), Disp: 1 each, Rfl: 3    Allergies  Allergies   Allergen Reactions     Augmentin Hives     Bee Venom Anaphylaxis     Lac Bovis GI Disturbance     Pcn [Penicillins] Nausea and Vomiting     vomiting       Social History  Social History     Socioeconomic History     Marital status:      Spouse name: Not on file     Number of children: Not on file     Years of education: Not on file     Highest education level: Not on file   Occupational History     Not on file   Tobacco Use     Smoking status: Former Smoker     Types: Cigarettes     Quit date: 2007     Years since quittin.7     Smokeless tobacco: Never Used   Substance and Sexual Activity     Alcohol use: No     Drug use: No     Sexual activity: Not Currently   Other Topics Concern     Parent/sibling w/ CABG, MI or angioplasty before 65F 55M? No   Social History Narrative     Not on file     Social Determinants of Health     Financial Resource Strain: Not on file   Food Insecurity: Not on file   Transportation Needs: Not on file   Physical Activity: Not on file   Stress: Not on file   Social Connections: Not on file   Intimate Partner Violence: Not on file   Housing Stability: Not on file       Family History  Family History   Problem Relation Age of Onset     Cardiovascular Father        Review of Systems  As per HPI and PMHx, otherwise 10 system review including the head and neck, constitutional, eyes, respiratory, GI, skin, neurologic, lymphatic, endocrine, and allergy systems is negative.    Physical Exam  BP (!) 180/84 (Patient Position: Sitting, Cuff Size: Adult Regular)   Pulse 59   Temp 98.1  F (36.7  " C) (Tympanic)   Ht 1.702 m (5' 7\")   Wt 90.7 kg (200 lb)   BMI 31.32 kg/m    GENERAL: Patient is a pleasant, cooperative 82 year old male in no acute distress.  HEAD: Normocephalic, atraumatic.  Hair and scalp are normal.  EYES: Pupils are equal, round, reactive to light and accommodation.  Extraocular movements are intact.  The sclera nonicteric without injection.  The extraocular structures are normal.  EARS: See below.    NEUROLOGIC: Cranial nerves II through XII are grossly intact.  Voice is strong.  Facial nerve examination incomplete due to the patient wearing a mask.    CARDIOVASCULAR: Extremities are warm and well-perfused.  No significant peripheral edema.  RESPIRATORY: Patient has nonlabored breathing without cough, wheeze, stridor.  PSYCHIATRIC: Patient is alert and oriented.  Mood and affect appear normal.  SKIN: Warm and dry.  No scalp, face, or neck lesions noted.    Physical Exam and Procedure    EARS: Normal shape and symmetry.  No tenderness when palpating the mastoid or tragal areas bilaterally.  The ears were then examined under the otic binocular microscope to perform cerumen removal.  Otoscopic exam on the right reveals impacted cerumen and extruded ear tube down to the level of the tympanic membrane.  The cerumen and extruded ear tube were removed with a #7 suction and alligator forceps.  The right tympanic membrane was round, intact without evidence of effusion.  No retraction, granulation, drainage, or evidence of cholesteatoma.      Attention was turned to the left ear.  Otoscopic exam on the left reveals impacted cerumen down to the level of the tympanic membrane.  The cerumen was removed with #7 suction.  The left tympanic membrane was round, intact without evidence of effusion.  No retraction, granulation, drainage, or evidence of cholesteatoma.      Audiogram  The patient underwent an audiogram performed today.  My review of the audiogram shows borderline normal hearing to 1000 Hz " sloping to mild sloping to moderately severe sensorineural hearing loss in the right ear and moderate sloping to severe mixed hearing loss in the left ear.  Pure-tone average is 43 dB on the right and 63 dB on the left.  Speech reception threshold is 30 dB on the right and 50 dB on the left.  The patient had 92% word recognition on the right and 68% word recognition on the left.  The patient had a type A tympanogram on the right and a type A shallow tympanogram on the left.    Assessment and Plan     ICD-10-CM    1. 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 Mixed conductive and sensorineural hearing loss of left ear with restricted hearing of right ear  H90.A32 Adult Audiology Referral     Microscopy, Binocular   3. Bilateral impacted cerumen  H61.23 Adult Audiology Referral     Microscopy, Binocular   4. Normal ear exam  Z01.10 Adult Audiology Referral     Microscopy, Binocular      It was my pleasure seeing Deniz Cortes today in clinic.  The patient's right ear tube has extruded and his eardrum is healed well.  He has no middle ear effusion and his hearing on the right has returned to baseline.  I would recommend observation at this time.  The patient is not currently interested in hearing aids for either ear.  He would be a candidate for amplification in both ears if he would like to pursue amplification.  We can see the patient back as needed for ear cleaning or any other hearing issues.  Patient knows to contact with any problems or concerns.    Sarbjit to follow up with Primary Care provider regarding elevated blood pressure.    Sundar Yang MD  Department of Otolaryngology-Head and Neck Surgery  [facility]         Again, thank you for allowing me to participate in the care of your patient.        Sincerely,        Sundar Yang MD

## 2022-02-16 NOTE — PROGRESS NOTES
AUDIOLOGY REPORT:    Patient was referred to [facility] from ENT by Dr. Yang for a hearing examination. Patient reports he is here for a 6 month follow up and feels the hearing in his left ear has declined. Patient was unaccompanied to today's visit.     Testing:    Otoscopy:   Otoscopic exam indicates ears have minimal cerumen bilaterally. A PE tube is visible in the right ear tympanic membrane.     Tympanograms:    RIGHT: normal eardrum mobility     LEFT:   restricted eardrum mobility (Type As)    Reflexes (reported by stimulus ear): 1000 Hz   Could not maintain a seal to obtain     Thresholds:   Pure Tone Thresholds assessed using conventional audiometry with good  reliability from 250-8000 Hz bilaterally using insert earphones and circumaural headphones     RIGHT:  normal and borderline-normal hearing sensitivity through 1500 Hz then a moderate to severe sensorineural hearing loss    LEFT:    mild and profound mixed hearing loss    NOTE: This is an improvement in hearing for the low frequencies of both ears compared to 8/16/2021 testing.     Speech Reception Threshold:    RIGHT: 30 dB HL    LEFT:   50 dB HL    Word Recognition Score:     RIGHT: 92% at 80 dB HL using NU-6 recorded word list.    LEFT:   68% at 90 dB HL using NU-6 recorded word list.    Discussed results with the patient.     Patient was returned to ENT for follow up.     Elier Jorgensen CCC-A  Licensed Audiologist  2/16/2022

## 2022-02-16 NOTE — PATIENT INSTRUCTIONS
Per physician instructions      If you have questions or concerns on any instructions given to you by your provider today or if you need to schedule an appointment, you can reach us at [phone].

## 2023-03-24 ENCOUNTER — APPOINTMENT (OUTPATIENT)
Dept: GENERAL RADIOLOGY | Facility: CLINIC | Age: 83
End: 2023-03-24
Attending: EMERGENCY MEDICINE
Payer: COMMERCIAL

## 2023-03-24 ENCOUNTER — APPOINTMENT (OUTPATIENT)
Dept: MRI IMAGING | Facility: CLINIC | Age: 83
End: 2023-03-24
Attending: EMERGENCY MEDICINE
Payer: COMMERCIAL

## 2023-03-24 ENCOUNTER — APPOINTMENT (OUTPATIENT)
Dept: CT IMAGING | Facility: CLINIC | Age: 83
End: 2023-03-24
Attending: EMERGENCY MEDICINE
Payer: COMMERCIAL

## 2023-03-24 ENCOUNTER — HOSPITAL ENCOUNTER (EMERGENCY)
Facility: CLINIC | Age: 83
Discharge: OTHER INSTITUTION | End: 2023-03-25
Attending: EMERGENCY MEDICINE | Admitting: EMERGENCY MEDICINE
Payer: COMMERCIAL

## 2023-03-24 DIAGNOSIS — Z91.041 ALLERGIC TO IV CONTRAST: ICD-10-CM

## 2023-03-24 DIAGNOSIS — I63.239 CAROTID ARTERY STENOSIS WITH CEREBRAL INFARCTION (H): ICD-10-CM

## 2023-03-24 DIAGNOSIS — I63.9 ACUTE ISCHEMIC STROKE (H): ICD-10-CM

## 2023-03-24 LAB
ANION GAP SERPL CALCULATED.3IONS-SCNC: 10 MMOL/L (ref 7–15)
APTT PPP: 31 SECONDS (ref 22–38)
BASOPHILS # BLD AUTO: 0 10E3/UL (ref 0–0.2)
BASOPHILS NFR BLD AUTO: 0 %
BUN SERPL-MCNC: 21 MG/DL (ref 8–23)
CALCIUM SERPL-MCNC: 9.4 MG/DL (ref 8.8–10.2)
CHLORIDE SERPL-SCNC: 104 MMOL/L (ref 98–107)
CREAT SERPL-MCNC: 1.81 MG/DL (ref 0.67–1.17)
DEPRECATED HCO3 PLAS-SCNC: 25 MMOL/L (ref 22–29)
EOSINOPHIL # BLD AUTO: 0.1 10E3/UL (ref 0–0.7)
EOSINOPHIL NFR BLD AUTO: 1 %
ERYTHROCYTE [DISTWIDTH] IN BLOOD BY AUTOMATED COUNT: 13.6 % (ref 10–15)
GFR SERPL CREATININE-BSD FRML MDRD: 37 ML/MIN/1.73M2
GLUCOSE SERPL-MCNC: 108 MG/DL (ref 70–99)
HCT VFR BLD AUTO: 42.3 % (ref 40–53)
HGB BLD-MCNC: 14.8 G/DL (ref 13.3–17.7)
HOLD SPECIMEN: NORMAL
IMM GRANULOCYTES # BLD: 0.1 10E3/UL
IMM GRANULOCYTES NFR BLD: 1 %
INR PPP: 1 (ref 0.85–1.15)
LYMPHOCYTES # BLD AUTO: 0.8 10E3/UL (ref 0.8–5.3)
LYMPHOCYTES NFR BLD AUTO: 11 %
MCH RBC QN AUTO: 34.4 PG (ref 26.5–33)
MCHC RBC AUTO-ENTMCNC: 35 G/DL (ref 31.5–36.5)
MCV RBC AUTO: 98 FL (ref 78–100)
MONOCYTES # BLD AUTO: 0.5 10E3/UL (ref 0–1.3)
MONOCYTES NFR BLD AUTO: 6 %
NEUTROPHILS # BLD AUTO: 5.8 10E3/UL (ref 1.6–8.3)
NEUTROPHILS NFR BLD AUTO: 81 %
NRBC # BLD AUTO: 0 10E3/UL
NRBC BLD AUTO-RTO: 0 /100
PLATELET # BLD AUTO: 213 10E3/UL (ref 150–450)
POTASSIUM SERPL-SCNC: 3.9 MMOL/L (ref 3.4–5.3)
RBC # BLD AUTO: 4.3 10E6/UL (ref 4.4–5.9)
SODIUM SERPL-SCNC: 139 MMOL/L (ref 136–145)
TROPONIN T SERPL HS-MCNC: 16 NG/L
WBC # BLD AUTO: 7.1 10E3/UL (ref 4–11)

## 2023-03-24 PROCEDURE — 93005 ELECTROCARDIOGRAM TRACING: CPT | Performed by: EMERGENCY MEDICINE

## 2023-03-24 PROCEDURE — 82310 ASSAY OF CALCIUM: CPT | Performed by: EMERGENCY MEDICINE

## 2023-03-24 PROCEDURE — A9585 GADOBUTROL INJECTION: HCPCS | Performed by: EMERGENCY MEDICINE

## 2023-03-24 PROCEDURE — 96376 TX/PRO/DX INJ SAME DRUG ADON: CPT | Performed by: EMERGENCY MEDICINE

## 2023-03-24 PROCEDURE — 250N000011 HC RX IP 250 OP 636: Performed by: EMERGENCY MEDICINE

## 2023-03-24 PROCEDURE — 36415 COLL VENOUS BLD VENIPUNCTURE: CPT | Performed by: EMERGENCY MEDICINE

## 2023-03-24 PROCEDURE — 99292 CRITICAL CARE ADDL 30 MIN: CPT | Mod: 25 | Performed by: EMERGENCY MEDICINE

## 2023-03-24 PROCEDURE — 70498 CT ANGIOGRAPHY NECK: CPT

## 2023-03-24 PROCEDURE — 84484 ASSAY OF TROPONIN QUANT: CPT | Performed by: EMERGENCY MEDICINE

## 2023-03-24 PROCEDURE — 70496 CT ANGIOGRAPHY HEAD: CPT

## 2023-03-24 PROCEDURE — 85730 THROMBOPLASTIN TIME PARTIAL: CPT | Performed by: EMERGENCY MEDICINE

## 2023-03-24 PROCEDURE — 250N000013 HC RX MED GY IP 250 OP 250 PS 637: Performed by: EMERGENCY MEDICINE

## 2023-03-24 PROCEDURE — 96374 THER/PROPH/DIAG INJ IV PUSH: CPT | Mod: 59 | Performed by: EMERGENCY MEDICINE

## 2023-03-24 PROCEDURE — 85610 PROTHROMBIN TIME: CPT | Performed by: EMERGENCY MEDICINE

## 2023-03-24 PROCEDURE — 0042T CT HEAD PERFUSION W CONTRAST: CPT

## 2023-03-24 PROCEDURE — 96375 TX/PRO/DX INJ NEW DRUG ADDON: CPT | Performed by: EMERGENCY MEDICINE

## 2023-03-24 PROCEDURE — 93010 ELECTROCARDIOGRAM REPORT: CPT | Performed by: EMERGENCY MEDICINE

## 2023-03-24 PROCEDURE — 70553 MRI BRAIN STEM W/O & W/DYE: CPT

## 2023-03-24 PROCEDURE — 255N000002 HC RX 255 OP 636: Performed by: EMERGENCY MEDICINE

## 2023-03-24 PROCEDURE — 70450 CT HEAD/BRAIN W/O DYE: CPT

## 2023-03-24 PROCEDURE — 99291 CRITICAL CARE FIRST HOUR: CPT | Mod: 25 | Performed by: EMERGENCY MEDICINE

## 2023-03-24 PROCEDURE — 250N000009 HC RX 250: Performed by: EMERGENCY MEDICINE

## 2023-03-24 PROCEDURE — 85014 HEMATOCRIT: CPT | Performed by: EMERGENCY MEDICINE

## 2023-03-24 PROCEDURE — 99222 1ST HOSP IP/OBS MODERATE 55: CPT | Performed by: NURSE PRACTITIONER

## 2023-03-24 PROCEDURE — 99207 PR NO CHARGE LOS: CPT | Performed by: NURSE PRACTITIONER

## 2023-03-24 PROCEDURE — 73130 X-RAY EXAM OF HAND: CPT | Mod: LT

## 2023-03-24 RX ORDER — IOPAMIDOL 755 MG/ML
68 INJECTION, SOLUTION INTRAVASCULAR ONCE
Status: COMPLETED | OUTPATIENT
Start: 2023-03-24 | End: 2023-03-24

## 2023-03-24 RX ORDER — METHYLPREDNISOLONE SODIUM SUCCINATE 125 MG/2ML
125 INJECTION, POWDER, LYOPHILIZED, FOR SOLUTION INTRAMUSCULAR; INTRAVENOUS ONCE
Status: COMPLETED | OUTPATIENT
Start: 2023-03-24 | End: 2023-03-24

## 2023-03-24 RX ORDER — ASPIRIN 325 MG
325 TABLET ORAL ONCE
Status: COMPLETED | OUTPATIENT
Start: 2023-03-24 | End: 2023-03-24

## 2023-03-24 RX ORDER — DIPHENHYDRAMINE HYDROCHLORIDE 50 MG/ML
25 INJECTION INTRAMUSCULAR; INTRAVENOUS ONCE
Status: COMPLETED | OUTPATIENT
Start: 2023-03-24 | End: 2023-03-24

## 2023-03-24 RX ORDER — IOPAMIDOL 755 MG/ML
100 INJECTION, SOLUTION INTRAVASCULAR ONCE
Status: COMPLETED | OUTPATIENT
Start: 2023-03-24 | End: 2023-03-24

## 2023-03-24 RX ORDER — DIPHENHYDRAMINE HYDROCHLORIDE 50 MG/ML
25 INJECTION INTRAMUSCULAR; INTRAVENOUS ONCE
Status: DISCONTINUED | OUTPATIENT
Start: 2023-03-24 | End: 2023-03-24

## 2023-03-24 RX ORDER — CLOPIDOGREL BISULFATE 75 MG/1
300 TABLET ORAL ONCE
Status: COMPLETED | OUTPATIENT
Start: 2023-03-24 | End: 2023-03-24

## 2023-03-24 RX ORDER — LABETALOL HYDROCHLORIDE 5 MG/ML
20 INJECTION, SOLUTION INTRAVENOUS ONCE
Status: COMPLETED | OUTPATIENT
Start: 2023-03-24 | End: 2023-03-24

## 2023-03-24 RX ORDER — LABETALOL HYDROCHLORIDE 5 MG/ML
20 INJECTION, SOLUTION INTRAVENOUS ONCE
Status: DISCONTINUED | OUTPATIENT
Start: 2023-03-24 | End: 2023-03-24

## 2023-03-24 RX ORDER — GADOBUTROL 604.72 MG/ML
8.5 INJECTION INTRAVENOUS ONCE
Status: COMPLETED | OUTPATIENT
Start: 2023-03-24 | End: 2023-03-24

## 2023-03-24 RX ADMIN — SODIUM CHLORIDE 100 ML: 9 INJECTION, SOLUTION INTRAVENOUS at 16:38

## 2023-03-24 RX ADMIN — METHYLPREDNISOLONE SODIUM SUCCINATE 125 MG: 125 INJECTION, POWDER, FOR SOLUTION INTRAMUSCULAR; INTRAVENOUS at 17:48

## 2023-03-24 RX ADMIN — CLOPIDOGREL BISULFATE 300 MG: 75 TABLET ORAL at 17:36

## 2023-03-24 RX ADMIN — ASPIRIN 325 MG ORAL TABLET 325 MG: 325 PILL ORAL at 17:42

## 2023-03-24 RX ADMIN — IOPAMIDOL 68 ML: 755 INJECTION, SOLUTION INTRAVENOUS at 16:38

## 2023-03-24 RX ADMIN — LABETALOL HYDROCHLORIDE 20 MG: 5 INJECTION, SOLUTION INTRAVENOUS at 19:55

## 2023-03-24 RX ADMIN — GADOBUTROL 8.5 ML: 604.72 INJECTION INTRAVENOUS at 19:21

## 2023-03-24 RX ADMIN — DIPHENHYDRAMINE HYDROCHLORIDE 25 MG: 50 INJECTION, SOLUTION INTRAMUSCULAR; INTRAVENOUS at 17:41

## 2023-03-24 RX ADMIN — IOPAMIDOL 100 ML: 755 INJECTION, SOLUTION INTRAVENOUS at 16:38

## 2023-03-24 RX ADMIN — LABETALOL HYDROCHLORIDE 20 MG: 5 INJECTION, SOLUTION INTRAVENOUS at 21:07

## 2023-03-24 ASSESSMENT — ACTIVITIES OF DAILY LIVING (ADL)
ADLS_ACUITY_SCORE: 35

## 2023-03-24 NOTE — ED NOTES
Pt developed hives to neck and face, pt denies sob at this time. Notified Dr. Park, new orders for 25mg IV benadryl now

## 2023-03-24 NOTE — CONSULTS
[facility]    Stroke Telephone Note    I was called by Abelardo Park Md on 03/24/23 regarding patient Deniz Denneyraulelida. The patient is a 83 year old male with past medical history significant for bladder cancer and prior tobacco use. He was brought to the ED 3/24/23 for evaluation of weakness. He reportedly had trouble writing yesterday but did not have overt weakness. He went to bed at 2200 and awoke at 0600 with RUE weakness and ataxia. Per ED, he also appears globally confused.     Stroke Code Data (for stroke code without tele)  Stroke code activated 03/24/23   1627   Stroke provider first response  03/24/23   1628            Last known normal 03/23/23   (S)  (had some difficulty writing yesterday-almost 24 hours ago)        Time of discovery   (or onset of symptoms) 03/24/23   (S) 0600 (noticed pronounced R arm weakness and confusion)   Head CT read by Stroke Neuro Dr/Provider 03/24/23   1649   Was stroke code de-escalated? Yes 03/24/23 1701          Imaging Findings   CTH negative for acute pathology. Right frontal encephalomalacia and left parietal hypodensity noted.   CTA head/neck with severe bilateral carotid stenosis. Formal read pending.     Intravenous Thrombolysis  Not given due to:   - unclear or unfavorable risk-benefit profile for extended window thrombolysis beyond the conventional 4.5 hour time window    Endovascular Treatment  Not initiated due to absence of proximal vessel occlusion    Impression  RUE weakness and ataxia, suspect acute stroke in the setting of severe L ICA stenosis. Noted to additionally have severe R ICA stenosis.     Recommendations   - MRI brain w/wo. Pending results, may need transfer for vascular surgery evaluation. Please page on call stroke neurology when completed.   - Permissive HTN, treat for SBP >220  - Plavix 300 mg +  mg now    My recommendations are based on the information provided over the phone by Deniz Cortes's  "in-person providers. They are not intended to replace the clinical judgment of his in-person providers. I was not requested to personally see or examine the patient at this time.    Yany Oconnell, CNP  Vascular Neurology    To page me or covering stroke neurology team member, click here: [facility]  Choose \"On Call\" tab at top, then select \"NEUROLOGY/ALL SITES\" from middle drop-down box, press Enter, then look for \"stroke\" or \"telestroke\" for your site.        "

## 2023-03-24 NOTE — ED PROVIDER NOTES
History     Chief Complaint   Patient presents with     Extremity Weakness     HPI   4:25 PM - Emergent Stroke Eval, Stroke code/tier 2 code initiated, last well-known time was a least 24 hours ago.  History per patient, review of [facility] EMR and [facility] EMR, review of multiple clinic notes and records from [facility], he is a patient of the [facility], and review of baseline laboratory evaluation and review of prior baseline creatinine and GFR studies due to history of chronic kidney disease, and review of head and neck MRI/MRA stroke evaluation studies 9/19/2021  Deniz Cortes is a 83 year old male who presents by EMS from his home where he lives with his wife who noted right arm weakness and confusion after he woke this morning at ~ 6:00 AM. He had gone to bed at 10 PM last evening, but this was not his last known well time as his wife reported that he had difficulty writing with his right arm, dominant arm, sometime yesterday making his last known well time over 24 hours ago. He is pleasant, but is confused and an unreliable historian.  He is unable to provide reliable history. He denies headache, nausea, vomiting, recent head injury or head trauma, visual deficit or motor or sensory deficit.  No recent symptoms of illness or infection. No anticoagulation therapy.  His wife did not want to come to the emergency department/hospital and his 2 neighbors who are nurses and assist both of them in their home came to help him and facilitate this evaluation.    Previous Records Reviewed:  9/19/2011 MRA neck without and with contrast, and MR angiogram of the head without contrast studies for vertigo:  Normal.    Allergies:  Allergies   Allergen Reactions     Iodinated Contrast Media [Diagnostic X-Ray Materials] Hives     Urticaria/hives from IV contrast for CT imaging 3/20/23     Augmentin Hives     Bee Venom Anaphylaxis     Lac Bovis GI Disturbance     Pcn [Penicillins] Nausea and Vomiting      vomiting       Problem List:    Patient Active Problem List    Diagnosis Date Noted     Malignant neoplasm of urinary bladder, unspecified site (H) 05/10/2021     Priority: Medium     Advanced directives, counseling/discussion 04/11/2014     Priority: Medium     Patient does not have an Advance/Health Care Directive (HCD), requests blank HCD form.    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 10/18/2007     Priority: Medium        Past Medical History:    Past Medical History:   Diagnosis Date     Bladder cancer (H)        Past Surgical History:    Past Surgical History:   Procedure Laterality Date     CATARACT IOL, RT/LT  2009    Cataract IOL LT     HERNIA REPAIR, INGUINAL RT/LT  2/2004    right     HERNIA REPAIR, INGUINAL RT/LT  9/2004    left     IR NEPHROSTOGRAM RIGHT  3/18/2019     IR NEPHROURETERAL CATHETER PLACEMENT RIGHT  3/11/2019     IR NEPHROURETERAL TUBE PLACEMENT RIGHT  3/11/2019     PHACOEMULSIFICATION WITH STANDARD INTRAOCULAR LENS IMPLANT  4/24/2014    Procedure: PHACOEMULSIFICATION WITH STANDARD INTRAOCULAR LENS IMPLANT;  Surgeon: Gerardo Fontana MD;  Location: [facility] OR     SURGICAL HISTORY OF -   10/2004    left thumb traumatic amputation surgery     SURGICAL HISTORY OF -   2002    left hand surgery       Family History:    Family History   Problem Relation Age of Onset     Cardiovascular Father        Social History:  Marital Status:   [2]  Social History     Tobacco Use     Smoking status: Former     Types: Cigarettes     Quit date: 5/9/2007     Years since quitting: 15.8     Smokeless tobacco: Never   Substance Use Topics     Alcohol use: No     Drug use: No        Medications:    betamethasone dipropionate (DIPROSONE) 0.05 % external lotion  docusate sodium (DSS) 100 MG capsule  EPINEPHrine (EPIPEN) 0.3 MG/0.3ML  "injection      Review of Systems   Per patient, EMS and 2 neighbors who assist he and his wife, both of whom are registered nurses.  As mentioned in the HPI, in addition focused review of systems was negative.    Physical Exam   BP: (!) 177/93  Pulse: 93  Temp: 98.2  F (36.8  C)  Resp: 18  Height: 170.2 cm (5' 7\")  Weight: 86.6 kg (191 lb)  SpO2: 96 %      Physical Exam  Vitals and nursing note reviewed.   Constitutional:       General: He is not in acute distress.     Appearance: Normal appearance. He is well-developed. He is not ill-appearing or diaphoretic.   HENT:      Head: Normocephalic and atraumatic.      Right Ear: External ear normal.      Left Ear: External ear normal.      Nose: Nose normal.      Mouth/Throat:      Mouth: Mucous membranes are dry.   Eyes:      General: No visual field deficit or scleral icterus.     Extraocular Movements: Extraocular movements intact.      Conjunctiva/sclera: Conjunctivae normal.      Pupils: Pupils are equal, round, and reactive to light.   Neck:      Trachea: No tracheal deviation.      Comments: Bilateral carotid bruits present.  Cardiovascular:      Rate and Rhythm: Normal rate and regular rhythm.      Pulses: Normal pulses.      Heart sounds: Normal heart sounds. No murmur heard.    No friction rub. No gallop.   Pulmonary:      Effort: Pulmonary effort is normal. No respiratory distress.      Breath sounds: Normal breath sounds. No wheezing, rhonchi or rales.   Abdominal:      General: There is no distension.      Palpations: Abdomen is soft.      Tenderness: There is no abdominal tenderness.   Musculoskeletal:         General: No swelling or tenderness. Normal range of motion.      Cervical back: Normal range of motion and neck supple.      Right lower leg: No edema.      Left lower leg: No edema.   Skin:     General: Skin is warm and dry.      Coloration: Skin is not pale.      Findings: No erythema or rash.   Neurological:      Mental Status: He is alert. He is " disoriented.      Cranial Nerves: Cranial nerves 2-12 are intact. No cranial nerve deficit, dysarthria or facial asymmetry.      Sensory: Sensation is intact. No sensory deficit.      Motor: Weakness (right extremity weakness and ataxia) present. No tremor, abnormal muscle tone or seizure activity.      Coordination: Finger-Nose-Finger Test abnormal. Impaired rapid alternating movements.   Psychiatric:         Mood and Affect: Mood normal.         Behavior: Behavior normal.         ED Course                 Procedures              EKG Interpretation:      Interpreted by Abelardo Park MD  Time reviewed: Upon completion  Symptoms at time of EKG: Upon completion  Rhythm: normal sinus   Rate: normal  Axis: normal  Ectopy: none  Conduction: normal  ST Segments/ T Waves: No significant or acute appearing ST-T wave changes  Q Waves: none  Comparison to prior: Unchanged from last EKG 9/19/2011  Clinical Impression: normal EKG, no significant or acute appearing ST-T wave changes    The patient has stroke symptoms:         ED Stroke specific documentation           NIHSS PDF     Patient last known well time: Unknown, sometime yesterday afternoon he began having difficulty writing, went to bed at 10 PM last evening and then awoke at 6 AM with obvious right arm weakness and confusion.  ED Provider first to bedside at: 4:16 PM  Stroke Neurology service was consulted at: 4:28 PM (Candy Charles)  CT Results received at: I independently reviewed the patient's CT head without contrast study at 4:51 PM and do not appreciate any acute acute intracranial hemorrhage.  Significant atrophy is present. Radiologist, Dr. Bennett,  reported CT head without contrast results at 4:59 PM.  Stroke Neurology service consultation: at 5:02 PM. Stroke code de-escalated.  Stroke neuro reports significant bilateral carotid stenosis, patient will need revascularization of the left internal carotid artery. MRI recommended.  Consulted with Dr. Bennett  radiologist again regarding the rest of his CT imaging at: 5:22 PM.  He reports a subacute left parietal infarct and significant internal carotid artery stenosis, left greater than right with over 90% stenosis of the left internal carotid artery.    Thrombolytics:   Not given due to:   - unclear or unfavorable risk-benefit profile for extended window thrombolysis beyond the conventional 4.5 hour time window    Endovascular Retrieval:  Not initiated due to absence of proximal vessel occlusion    National Institutes of Health Stroke Scale (Baseline)  Time Performed: 4:25 PM     Score    Level of consciousness: (0)   Alert, keenly responsive    LOC questions: (1)   Answers one question correctly    LOC commands: (1)   Performs one task correctly    Best gaze: (0)   Normal    Visual: (0)   No visual loss    Facial palsy: (0)   Normal symmetrical movements    Motor arm (left): (0)   No drift    Motor arm (right): (1)   Drift    Motor leg (left): (0)   No drift    Motor leg (right): (0)   No drift    Limb ataxia: (1)   Present in one limb, right arm    Sensory: (0)   Normal- no sensory loss    Best language: (0)   Normal- no aphasia    Dysarthria: (0)   Normal    Extinction and inattention: (1)   Visual, tacile, auditory, spatial, person inattention        Total Score:  5        Stroke Mimics were considered (including migraine headache, seizure disorder, hypoglycemia (or hyperglycemia), head or spinal trauma, CNS infection, Toxin ingestion and shock state (e.g. sepsis) .      National Institutes of Health Stroke Scale  Time Performed: 4:59 PM  Total Score: 5  (unchanged from last stroke score)      5:02 PM - Stroke code de-escalated.         Results for orders placed or performed during the hospital encounter of 03/24/23 (from the past 24 hour(s))   CBC with Platelets & Differential    Narrative    The following orders were created for panel order CBC with Platelets & Differential.  Procedure                                Abnormality         Status                     ---------                               -----------         ------                     CBC with platelets and d...[[id number]]  Abnormal            Final result                 Please view results for these tests on the individual orders.   Basic metabolic panel   Result Value Ref Range    Sodium 139 136 - 145 mmol/L    Potassium 3.9 3.4 - 5.3 mmol/L    Chloride 104 98 - 107 mmol/L    Carbon Dioxide (CO2) 25 22 - 29 mmol/L    Anion Gap 10 7 - 15 mmol/L    Urea Nitrogen 21.0 8.0 - 23.0 mg/dL    Creatinine 1.81 (H) 0.67 - 1.17 mg/dL    Calcium 9.4 8.8 - 10.2 mg/dL    Glucose 108 (H) 70 - 99 mg/dL    GFR Estimate 37 (L) >60 mL/min/1.73m2   INR   Result Value Ref Range    INR 1.00 0.85 - 1.15   Partial thromboplastin time   Result Value Ref Range    aPTT 31 22 - 38 Seconds   Troponin T, High Sensitivity   Result Value Ref Range    Troponin T, High Sensitivity 16 <=22 ng/L   [place] Draw    Narrative    The following orders were created for panel order [place] Draw.  Procedure                               Abnormality         Status                     ---------                               -----------         ------                     Extra Blue Top Tube[[id number]]                              Final result               Extra Red Top Tube[[id number]]                               Final result               Extra Green Top (Lithium...[[id number]]                      Final result               Extra Purple Top Tube[[id number]]                                                         Please view results for these tests on the individual orders.   CBC with platelets and differential   Result Value Ref Range    WBC Count 7.1 4.0 - 11.0 10e3/uL    RBC Count 4.30 (L) 4.40 - 5.90 10e6/uL    Hemoglobin 14.8 13.3 - 17.7 g/dL    Hematocrit 42.3 40.0 - 53.0 %    MCV 98 78 - 100 fL    MCH 34.4 (H) 26.5 - 33.0 pg    MCHC 35.0 31.5 - 36.5 g/dL    RDW 13.6 10.0 - 15.0 %    Platelet Count  213 150 - 450 10e3/uL    % Neutrophils 81 %    % Lymphocytes 11 %    % Monocytes 6 %    % Eosinophils 1 %    % Basophils 0 %    % Immature Granulocytes 1 %    NRBCs per 100 WBC 0 <1 /100    Absolute Neutrophils 5.8 1.6 - 8.3 10e3/uL    Absolute Lymphocytes 0.8 0.8 - 5.3 10e3/uL    Absolute Monocytes 0.5 0.0 - 1.3 10e3/uL    Absolute Eosinophils 0.1 0.0 - 0.7 10e3/uL    Absolute Basophils 0.0 0.0 - 0.2 10e3/uL    Absolute Immature Granulocytes 0.1 <=0.4 10e3/uL    Absolute NRBCs 0.0 10e3/uL   Extra Blue Top Tube   Result Value Ref Range    Hold Specimen JIC    Extra Red Top Tube   Result Value Ref Range    Hold Specimen JIC    Extra Green Top (Lithium Heparin) Tube   Result Value Ref Range    Hold Specimen JIC    CT Head w/o Contrast    Narrative    EXAM: CT HEAD W/O CONTRAST  LOCATION: [facility]  DATE/TIME: 3/24/2023 4:46 PM    INDICATION: confusion, RUE weakness  COMPARISON: None.  TECHNIQUE: Routine CT Head without IV contrast. Multiplanar reformats. Dose reduction techniques were used.    FINDINGS:  INTRACRANIAL CONTENTS: No intracranial hemorrhage, extraaxial collection, or mass effect.  Focal triangular area of loss of gray-white differentiation at the left parietal lobe measuring 14 x 14 mm series 3 image 23 is concerning for subacute infarct.   There is chronic encephalomalacia of the inferior and superior right frontal lobe. Moderate presumed chronic small vessel ischemic changes. Mild generalized volume loss. No hydrocephalus.     VISUALIZED ORBITS/SINUSES/MASTOIDS: Prior bilateral cataract surgery. Visualized portions of the orbits are otherwise unremarkable. No paranasal sinus mucosal disease. No middle ear or mastoid effusion.    BONES/SOFT TISSUES: No acute abnormality.      Impression    IMPRESSION:  1.  Subacute infarct of the left parietal lobe.    2.  Dr. Bennett discussed the above findings by telephone with Dr. Park at 5:03 PM 03/24/2023.   CTA Head Neck with Contrast     Narrative    EXAM: CTA HEAD NECK W CONTRAST, CT HEAD PERFUSION W CONTRAST  LOCATION: [facility]  DATE/TIME: 3/24/2023 4:50 PM    INDICATION: Confusion, right hypertrophy weakness. Code Stroke to evaluate for potential thrombolysis and thrombectomy. PLEASE READ IMMEDIATELY.  COMPARISON: None.  TECHNIQUE: Head and neck CT angiogram with IV contrast. Axial helical CT images of the head and neck vessels obtained during the arterial phase of intravenous contrast administration. Axial 2D reconstructed images and multiplanar 3D MIP reconstructed   images of the head and neck vessels were performed by the technologist. Additional CT cerebral perfusion was performed utilizing a second contrast bolus. Perfusion data were post processed with generation of standard perfusion maps and estimation of   ischemic/infarcted volumes utilizing standard threshold values. Dose reduction techniques were used. All stenosis measurements made according to NASCET criteria unless otherwise specified.  CONTRAST: 68mL isovue 370 (accession [id number]), 100mL isovue 370 (accession [id number])    FINDINGS:     HEAD CTA:  ANTERIOR CIRCULATION: No stenosis/occlusion, or high flow vascular malformation. Question 8 x 2 mm blister type aneurysm at the left petrous ICA series 6 image 418. There is nonstenotic atherosclerotic calcification of bilateral carotid siphons. Standard   [place] anatomy.    POSTERIOR CIRCULATION: No stenosis/occlusion, aneurysm, or high flow vascular malformation. Balanced vertebral arteries supply a normal basilar artery.     DURAL VENOUS SINUSES: Not well evaluated on a technical basis.    NECK CTA:  RIGHT CAROTID: Atherosclerotic plaque results in 70-90% stenosis in the right ICA. No dissection.    LEFT CAROTID: Atherosclerotic plaque results in critical, 90% or greater, stenosis in the left ICA. No dissection.    VERTEBRAL ARTERIES: No focal stenosis or dissection. Balanced vertebral  arteries.    AORTIC ARCH: Classic aortic arch anatomy. Atherosclerotic plaque produces approximately 50% stenosis of the left subclavian artery origin with several ulcerated plaques..    NONVASCULAR STRUCTURES: Unremarkable.    CT PERFUSION:  PERFUSION MAPS: Qualitatively visual analysis demonstrates elevated Tmax in mildly reduced CBV at the left parietal lobe corresponding with the area of abnormality on prior head CT representing small subacute infarct.    RAPID ANALYSIS:   CBF<30%: 0  Tmax>6sec: 0  Mismatch volume: 0  Mismatch ratio: None      Impression    IMPRESSION:     HEAD CTA:   1.  Negative for large vessel occlusion.  2.  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    NECK CTA:  1.  Greater than 90% stenosis of the left cervical internal carotid artery origin.  2.  70-90% stenosis of the right cervical internal carotid artery origin.  3.  50% stenosis of the left subclavian artery with ulcerated plaques.  4.  Normal vertebral arteries.    CT PERFUSION:  1.  Qualitative perfusion abnormality favored to represent subacute infarct at the left parietal lobe corresponding with abnormality from prior head CT. Lack of measurable abnormality on qualitative rapid analysis is likely secondary to small size.    2.  Dr. Bennett discussed the above findings by telephone Dr. Park at 5:23 PM 03/24/2023.   CT Head Perfusion w Contrast - For Tier 2 Stroke    Narrative    EXAM: CTA HEAD NECK W CONTRAST, CT HEAD PERFUSION W CONTRAST  LOCATION: [facility]  DATE/TIME: 3/24/2023 4:50 PM    INDICATION: Confusion, right hypertrophy weakness. Code Stroke to evaluate for potential thrombolysis and thrombectomy. PLEASE READ IMMEDIATELY.  COMPARISON: None.  TECHNIQUE: Head and neck CT angiogram with IV contrast. Axial helical CT images of the head and neck vessels obtained during the arterial phase of intravenous contrast administration. Axial 2D reconstructed images and  multiplanar 3D MIP reconstructed   images of the head and neck vessels were performed by the technologist. Additional CT cerebral perfusion was performed utilizing a second contrast bolus. Perfusion data were post processed with generation of standard perfusion maps and estimation of   ischemic/infarcted volumes utilizing standard threshold values. Dose reduction techniques were used. All stenosis measurements made according to NASCET criteria unless otherwise specified.  CONTRAST: 68mL isovue 370 (accession [id number]), 100mL isovue 370 (accession [id number])    FINDINGS:     HEAD CTA:  ANTERIOR CIRCULATION: No stenosis/occlusion, or high flow vascular malformation. Question 8 x 2 mm blister type aneurysm at the left petrous ICA series 6 image 418. There is nonstenotic atherosclerotic calcification of bilateral carotid siphons. Standard   [place] anatomy.    POSTERIOR CIRCULATION: No stenosis/occlusion, aneurysm, or high flow vascular malformation. Balanced vertebral arteries supply a normal basilar artery.     DURAL VENOUS SINUSES: Not well evaluated on a technical basis.    NECK CTA:  RIGHT CAROTID: Atherosclerotic plaque results in 70-90% stenosis in the right ICA. No dissection.    LEFT CAROTID: Atherosclerotic plaque results in critical, 90% or greater, stenosis in the left ICA. No dissection.    VERTEBRAL ARTERIES: No focal stenosis or dissection. Balanced vertebral arteries.    AORTIC ARCH: Classic aortic arch anatomy. Atherosclerotic plaque produces approximately 50% stenosis of the left subclavian artery origin with several ulcerated plaques..    NONVASCULAR STRUCTURES: Unremarkable.    CT PERFUSION:  PERFUSION MAPS: Qualitatively visual analysis demonstrates elevated Tmax in mildly reduced CBV at the left parietal lobe corresponding with the area of abnormality on prior head CT representing small subacute infarct.    RAPID ANALYSIS:   CBF<30%: 0  Tmax>6sec: 0  Mismatch volume: 0  Mismatch ratio:  None      Impression    IMPRESSION:     HEAD CTA:   1.  Negative for large vessel occlusion.  2.  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    NECK CTA:  1.  Greater than 90% stenosis of the left cervical internal carotid artery origin.  2.  70-90% stenosis of the right cervical internal carotid artery origin.  3.  50% stenosis of the left subclavian artery with ulcerated plaques.  4.  Normal vertebral arteries.    CT PERFUSION:  1.  Qualitative perfusion abnormality favored to represent subacute infarct at the left parietal lobe corresponding with abnormality from prior head CT. Lack of measurable abnormality on qualitative rapid analysis is likely secondary to small size.    2.  Dr. Bennett discussed the above findings by telephone Dr. Park at 5:23 PM 03/24/2023.   XR Hand Left G/E 3 Views    Narrative    EXAM: XR HAND LEFT G/E 3 VIEWS  LOCATION: [facility]  DATE/TIME: 3/24/2023 6:40 PM    INDICATION: Eval for metallic foreign body, thumb, screening for MRI evaluation for acute stroke  COMPARISON: None.      Impression    IMPRESSION: There are 2 punctate metallic foreign bodies in the distal soft tissue of the partially amputated thumb. Screw fixation in the second metacarpal neck. No acute fracture or malalignment. Degenerative changes throughout the hand and wrist.   Osteopenia.   MR Brain w/o & w Contrast    Narrative    EXAM: MR BRAIN W/O and W CONTRAST  LOCATION: [facility]  DATE/TIME: 3/24/2023 7:40 PM    INDICATION: Right arm weakness and ataxia, evidence of left parietal infarct on CT  COMPARISON: None.  CONTRAST: gadavist 8.5 ml  TECHNIQUE: Routine multiplanar multisequence head MRI without and with intravenous contrast.    FINDINGS:  INTRACRANIAL CONTENTS: Foci of restricted diffusion within the left cerebral hemisphere white matter involving the left frontal and parietal lobe consistent with acute infarcts. No other  evidence of abnormal restricted diffusion to suggest acute infarct.   Scattered foci of signal abnormality within the cerebral hemispheric white matter which are nonspecific, though most commonly ascribed to chronic small vessel ischemic disease. The ventricles and sulci are prominent consistent with moderate brain   parenchymal volume loss. No evidence of acute intracranial hemorrhage, mass effect, or extra-axial collection. Contrast enhancement is demonstrated involving the left parietal lobe consistent with evolving subacute infarct. No cerebellar tonsillar   ectopia.    SELLA: No abnormality accounting for technique.    OSSEOUS STRUCTURES/SOFT TISSUES: The visualized skull base and calvarium are unremarkable. Expected signal voids within the distal vertebral, basilar, and bilateral internal carotid arteries.    ORBITS: Bilateral cataract surgery.     SINUSES/MASTOIDS: The partially imaged paranasal sinuses are unremarkable. Right mastoid and middle ear effusion. The left mastoid air cells are unremarkable..      Impression    IMPRESSION:  1.  Foci of acute/subacute infarct within the left cerebral hemisphere involving the left frontal and parietal lobes. This is consistent with an evolving subacute infarct.  2.  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  3.  Moderate nonspecific white matter changes.  4.  Moderate brain parenchymal volume loss.  5.  Right mastoid and middle ear effusion.       Medications   iopamidol (ISOVUE-370) solution 68 mL (68 mLs Intravenous $Given 3/24/23 1638)   sodium chloride 0.9 % bag 500 mL for CT scan flush use (100 mLs Intravenous $Given 3/24/23 1638)   iopamidol (ISOVUE-370) solution 100 mL (100 mLs Intravenous $Given 3/24/23 1638)   sodium chloride 0.9 % bag 500mL for CT scan flush use (100 mLs As instructed $Given 3/24/23 1638)   aspirin (ASA) tablet 325 mg (325 mg Oral $Given 3/24/23 1742)   clopidogrel (PLAVIX) tablet 300 mg (300 mg Oral $Given  3/24/23 1736)   diphenhydrAMINE (BENADRYL) injection 25 mg (25 mg Intravenous $Given 3/24/23 1741)   methylPREDNISolone sodium succinate (solu-MEDROL) injection 125 mg (125 mg Intravenous $Given 3/24/23 1748)   gadobutrol (GADAVIST) injection 8.5 mL (8.5 mLs Intravenous $Given 3/24/23 1921)   labetalol (NORMODYNE/TRANDATE) injection 20 mg (20 mg Intravenous $Given 3/24/23 1955)       5:30 PM - he developed some urticarial lesions at the base of the neck and chest which appear to be secondary to IV contrast allergy after CT studies with IV contrast.  No signs or symptoms of allergic reaction or anaphylactic reaction. Will give Benadryl and Solu-Medrol and I will list IV contrast as an allergy in the EMR.  Stroke symptoms unchanged on reevaluation.      7:49 PM -I reviewed the case consulted with Dr. Maradiaga, admitting physcian at [facility].  He excepted care of the patient in transfer there, but requested I contact their stroke neurologist for review of the case and any further recommendations regarding anticoagulation therapy, such as heparinization.    8:40 PM - I reviewed the case and consulted with the stroke Neurologist at [facility], Dr. Monte.  He recommended dual antiplatelet therapy as we have already initiated, with no heparinization or other intervention or treatment at the present time.      Critical Care:    My initial assessment, based on my review of nursing observations, review of vital signs, focused history, physical exam, review of cardiac rhythm monitor, 12 lead ECG analysis, discussion with Stroke Neurology and interpretation of prior and today's imaging evaluations. , established that patient has ischemic or hemorrhagic stroke and altered mental status, which requires immediate intervention, and therefore She is critically ill.     After the initial assessment, the care team initiated multiple lab tests, initiated IV fluid administration, initiated medication therapy with dual  antiplatelet therapy with ASA and Plavix and consulted with Stroke Neurology and Hospitalist/admitting physician at [facility] to provide stabilization care. Due to the critical nature of this patient, I reassessed nursing observations, vital signs, physical exam, review of cardiac rhythm monitor, 12 lead ECG analysis, interpretation of imaging studies, mental status and neurologic status multiple times prior to She disposition.     Time also spent performing documentation, reviewing test results, discussion with consultants and coordination of care.     Critical care time (excluding teaching time and procedures): 80 minutes.       Assessments & Plan (with Medical Decision Making)   83-year-old male who presents by EMS from his home where he lives with his wife with over 24 hours of acute stroke symptoms with right upper extremity weakness and ataxia and confusion which began at an unknown time yesterday when his wife noticed he was having difficulty writing with his right/dominant hand, and were significantly exacerbated when he woke up this morning at 6 AM after going to bed at 10 PM last evening. CT/CTA head and neck imaging studies remarkable for a subacute left parietal stroke and significant internal carotid artery stenosis, left greater than right with left internal carotid artery stenosis being greater than 90%.  MRI brain without and with contrast study showed acute/subacute, evolving, left cerebral hemisphere infarct involving the left frontal and parietal lobes. The Stroke Neurology service was consulted and guided his evaluation and care in the ED and recommended full-strength aspirin and Plavix 300 mg dual antiplatelet therapy, which was initiated in the ED.  It was recommended that the patient be transferred for left internal carotid revascularization in the setting of a subacute left parietal stroke. No beds available in the [facility] and the patient is followed in an [email or web address]  [facility], a bed was obtained at [facility].  I consulted the Hospitalist on-call there who accepted his care in transfer and admission there and requested I consult their stroke neurologist on-call.  I spoke with the [facility] stroke neurologist who had no other recommendations and concurred with dual antiplatelet therapy with full-strength aspirin and Plavix loading, which we have initiated in the ED.  He developed mild upper chest and lower neck urticaria after IV contrast for CT imaging and was given Benadryl and Solu-Medrol for an IV contrast allergic reaction. No other signs or symptoms of allergic reaction or anaphylaxis.  His EMR was updated to reflect the IV contrast allergy.    I have reviewed the nursing notes.    I have reviewed the findings, diagnosis, plan and need for follow up with the patient and neighbors that are here to assist him. His wife did not want to come to the hospital/ED.    Medical Decision Making: High Complexity, Critical Care        New Prescriptions    No medications on file       Final diagnoses:   Acute ischemic stroke (H)   Carotid artery stenosis with cerebral infarction (H)   Allergic to IV contrast - Developed urticaria/hives after IV contrast for CT imaging       3/24/2023   [facility] EMERGENCY DEPT     Abelardo Park MD  03/25/23 7601

## 2023-03-25 ENCOUNTER — NURSE TRIAGE (OUTPATIENT)
Dept: NURSING | Facility: CLINIC | Age: 83
End: 2023-03-25
Payer: COMMERCIAL

## 2023-03-25 VITALS
BODY MASS INDEX: 29.98 KG/M2 | RESPIRATION RATE: 14 BRPM | OXYGEN SATURATION: 94 % | HEIGHT: 67 IN | DIASTOLIC BLOOD PRESSURE: 88 MMHG | HEART RATE: 75 BPM | WEIGHT: 191 LBS | SYSTOLIC BLOOD PRESSURE: 168 MMHG | TEMPERATURE: 98.2 F

## 2023-03-25 ASSESSMENT — ACTIVITIES OF DAILY LIVING (ADL): ADLS_ACUITY_SCORE: 35

## 2023-03-25 NOTE — ED NOTES
Pt Neighbor Edison [phone] has been caregiver at home for patient.  Pt agrees it is Ok to discuss ED plan with her as she is here to advocate for him.

## 2023-03-25 NOTE — TELEPHONE ENCOUNTER
[facility] PA calling for images done at [facility] to be pushed to [facility] PACs for viewing and needing the number of [facility] Imaging Dept.  Number given.  Sandi Perez RN  [facility] Nurse Advisor

## 2023-07-02 ENCOUNTER — HEALTH MAINTENANCE LETTER (OUTPATIENT)
Age: 83
End: 2023-07-02

## 2023-07-24 ENCOUNTER — MEDICAL CORRESPONDENCE (OUTPATIENT)
Dept: HEALTH INFORMATION MANAGEMENT | Facility: CLINIC | Age: 83
End: 2023-07-24

## 2023-08-17 ENCOUNTER — OFFICE VISIT (OUTPATIENT)
Dept: FAMILY MEDICINE | Facility: CLINIC | Age: 83
End: 2023-08-17
Payer: COMMERCIAL

## 2023-08-17 VITALS
BODY MASS INDEX: 26.09 KG/M2 | SYSTOLIC BLOOD PRESSURE: 130 MMHG | TEMPERATURE: 99 F | WEIGHT: 166.2 LBS | HEART RATE: 86 BPM | RESPIRATION RATE: 16 BRPM | OXYGEN SATURATION: 95 % | HEIGHT: 67 IN | DIASTOLIC BLOOD PRESSURE: 62 MMHG

## 2023-08-17 DIAGNOSIS — R13.10 DYSPHAGIA, UNSPECIFIED TYPE: ICD-10-CM

## 2023-08-17 DIAGNOSIS — I10 BENIGN ESSENTIAL HTN: ICD-10-CM

## 2023-08-17 DIAGNOSIS — E53.8 FOLATE DEFICIENCY: ICD-10-CM

## 2023-08-17 DIAGNOSIS — Z93.6 NEPHROSTOMY STATUS (H): Primary | ICD-10-CM

## 2023-08-17 DIAGNOSIS — Z86.73 H/O: CVA (CEREBROVASCULAR ACCIDENT): ICD-10-CM

## 2023-08-17 PROCEDURE — 99203 OFFICE O/P NEW LOW 30 MIN: CPT | Performed by: NURSE PRACTITIONER

## 2023-08-17 RX ORDER — CLOPIDOGREL BISULFATE 75 MG/1
75 TABLET ORAL
COMMUNITY
Start: 2023-03-30 | End: 2023-09-06

## 2023-08-17 RX ORDER — FOLIC ACID 1 MG/1
TABLET ORAL
COMMUNITY
Start: 2023-07-07 | End: 2024-07-01

## 2023-08-17 RX ORDER — ATORVASTATIN CALCIUM 40 MG/1
40 TABLET, FILM COATED ORAL
COMMUNITY
Start: 2023-03-29 | End: 2023-08-17

## 2023-08-17 RX ORDER — QUETIAPINE FUMARATE 25 MG/1
25 TABLET, FILM COATED ORAL AT BEDTIME
COMMUNITY
Start: 2023-07-07 | End: 2023-08-17

## 2023-08-17 RX ORDER — ASPIRIN 81 MG/1
81 TABLET, CHEWABLE ORAL
COMMUNITY
Start: 2023-03-30 | End: 2023-08-17

## 2023-08-17 RX ORDER — ACETAMINOPHEN 500 MG
500-1000 TABLET ORAL EVERY 8 HOURS PRN
COMMUNITY
Start: 2023-08-17

## 2023-08-17 RX ORDER — PHENOL 1.4 %
10 AEROSOL, SPRAY (ML) MUCOUS MEMBRANE AT BEDTIME
COMMUNITY
Start: 2023-07-07 | End: 2023-08-17

## 2023-08-17 RX ORDER — AMLODIPINE BESYLATE 10 MG/1
TABLET ORAL
COMMUNITY
Start: 2023-07-07 | End: 2023-11-30

## 2023-08-18 ENCOUNTER — TELEPHONE (OUTPATIENT)
Dept: FAMILY MEDICINE | Facility: CLINIC | Age: 83
End: 2023-08-18

## 2023-08-18 PROBLEM — D53.9 MACROCYTIC ANEMIA: Status: ACTIVE | Noted: 2019-06-28

## 2023-08-18 PROBLEM — Z93.6 NEPHROSTOMY STATUS (H): Status: ACTIVE | Noted: 2023-02-27

## 2023-08-18 PROBLEM — C67.9 MALIGNANT NEOPLASM OF URINARY BLADDER, UNSPECIFIED SITE (H): Status: RESOLVED | Noted: 2021-05-10 | Resolved: 2023-08-18

## 2023-08-18 PROBLEM — S32.031D: Status: ACTIVE | Noted: 2023-06-20

## 2023-08-18 PROBLEM — N18.30 CKD (CHRONIC KIDNEY DISEASE) STAGE 3, GFR 30-59 ML/MIN (H): Status: ACTIVE | Noted: 2019-06-28

## 2023-08-18 PROBLEM — Z86.73 H/O: CVA (CEREBROVASCULAR ACCIDENT): Status: ACTIVE | Noted: 2023-06-21

## 2023-08-18 PROBLEM — R13.10 DYSPHAGIA: Status: ACTIVE | Noted: 2023-06-29

## 2023-08-18 PROBLEM — Z85.51 PERSONAL HISTORY OF BLADDER CANCER: Status: ACTIVE | Noted: 2020-09-02

## 2023-08-18 PROBLEM — E53.8 FOLATE DEFICIENCY: Status: ACTIVE | Noted: 2023-06-21

## 2023-08-18 NOTE — TELEPHONE ENCOUNTER
Spoke with Karolina for 15 minutes regarding this patient and updated her on my assessment and changes from our visit yesterday.

## 2023-08-18 NOTE — TELEPHONE ENCOUNTER
"Karolina from [facility] home care calling regarding concern for patient safety.     States she was called in to see Sarbjit after hospital stay for fall in which he hurt his back and had a stroke.     States a lot of concern for this patient due to cognitive impairments and lack of help from wife or daughter. States he needs 24 hour supervision.     He is still driving and \"should not be\". OT has notified DMV. Wife cannot drive due to vision problems.     Currently has a urostomy which she is trying to teach his wife to care for however only has 1 visit for the next 3 weeks.     Patient cannot read or write and spouse does not assist with medications. Also has dexterity problems. Patient cannot pronounce words properly.     Concern for aspiration as he has swallowing problems, patient is supposed to be on thickened liquids and is not.     Wife is refusing PT, OT, and Speech therapy.     States \"wife is angry and confused during visits\". She will only let Karolina in for visits.   \"Her energy level takes it to a whole new level with Sarbjit\". Wife does not trust medical field. States she wants him off medications so he can fix her broken kitchen light and the meds confuse him.  Karolina advised to NOT have him climb a ladder. Wife says he has done this for years.     His prior PCP at [facility] Dr. Bah ph. [phone].   Requesting Carina to see patient as PCP.     Karolina will have OT report sent to , given direct fax.     Requesting to have visit summary and AVS faxed to [facility]: [phone].  Completed, Confirmed.     Karolina: [phone]     Ijeoma Herrera RN on 8/18/2023 at 10:52 AM        "

## 2023-08-28 ENCOUNTER — TELEPHONE (OUTPATIENT)
Dept: FAMILY MEDICINE | Facility: CLINIC | Age: 83
End: 2023-08-28
Payer: COMMERCIAL

## 2023-08-28 NOTE — TELEPHONE ENCOUNTER
Reason for Call:  Home Health Care    Winifred with [facility] called regarding (reason for call): FYI    PT: Patient is declining PT visits, so they will do an non-visit discharge    Phone Number Homecare Nurse can be reached at: [phone]    Can we leave a detailed message on this number? YES    Phone number patient can be reached at: Home number on file [phone] (home)    Best Time: Any

## 2023-09-05 ENCOUNTER — MEDICAL CORRESPONDENCE (OUTPATIENT)
Dept: HEALTH INFORMATION MANAGEMENT | Facility: CLINIC | Age: 83
End: 2023-09-05
Payer: COMMERCIAL

## 2023-09-05 DIAGNOSIS — Z86.73 H/O: CVA (CEREBROVASCULAR ACCIDENT): Primary | ICD-10-CM

## 2023-09-06 RX ORDER — CLOPIDOGREL BISULFATE 75 MG/1
75 TABLET ORAL DAILY
Qty: 90 TABLET | Refills: 3 | Status: SHIPPED | OUTPATIENT
Start: 2023-09-06 | End: 2024-07-16

## 2023-09-06 NOTE — TELEPHONE ENCOUNTER
"Routing refill request to provider for review/approval because:  Medication is reported/historical    Requested Prescriptions   Pending Prescriptions Disp Refills    clopidogrel (PLAVIX) 75 MG tablet [Pharmacy Med Name: clopidogrel 75 mg tablet] 30 tablet 0     Sig: Take 1 Tablet (75 mg) by mouth daily. Indications: Cerebrovascular Accident or Stroke       Plavix Passed - 9/5/2023 12:58 PM        Passed - No active PPI on record unless is Protonix        Passed - Normal HGB on file in past 12 months     Recent Labs   Lab Test 03/24/23  1636   HGB 14.8               Passed - Normal Platelets on file in past 12 months     Recent Labs   Lab Test 03/24/23  1636                  Passed - Recent (12 mo) or future (30 days) visit within the authorizing provider's specialty     Patient has had an office visit with the authorizing provider or a provider within the authorizing providers department within the previous 12 mos or has a future within next 30 days. See \"Patient Info\" tab in inbasket, or \"Choose Columns\" in Meds & Orders section of the refill encounter.              Passed - Medication is active on med list        Passed - Patient is age 18 or older                 "

## 2023-09-12 ENCOUNTER — VIRTUAL VISIT (OUTPATIENT)
Dept: FAMILY MEDICINE | Facility: CLINIC | Age: 83
End: 2023-09-12
Payer: COMMERCIAL

## 2023-09-12 DIAGNOSIS — Z86.73 H/O: CVA (CEREBROVASCULAR ACCIDENT): Primary | ICD-10-CM

## 2023-09-12 DIAGNOSIS — Z93.6 NEPHROSTOMY STATUS (H): ICD-10-CM

## 2023-09-12 PROCEDURE — 99212 OFFICE O/P EST SF 10 MIN: CPT | Mod: 95 | Performed by: NURSE PRACTITIONER

## 2023-09-12 NOTE — PROGRESS NOTES
Sarbjit is a 83 year old who is being evaluated via a billable telephone visit.      What phone number would you like to be contacted at? [phone]  How would you like to obtain your AVS? Lesa    Distant Location (provider location):  On-site    Assessment & Plan     H/O: CVA (cerebrovascular accident)    I have examined this patient in clinic recently and have found him to be mentally and physically safe to operate a vehicle. Discussed with him today and he is oriented and appropriately answers my questions. Letter provided for DMV.     Nephrostomy status    - Stable, wife is providing cares post CVA, continue current stoma and appliance cares.        FUTURE APPOINTMENTS:       - Follow-up visit in 6 months, sooner LINN Davalos, APRN CNP  [facility]   Sarbjit is a 83 year old, presenting for the following health issues:  Forms (DMV/)        9/12/2023     1:33 PM   Additional Questions   Roomed by Ashia RALPH       HPI     Was reported to the DMV by home care that they were concerned with his ability to drive. They would like to discuss. Has been driving and doing work out in the yard without difficulty.     Decreased fine motor skills for changing and caring for nephrostomy and appliance, wife reports she is doing this without difficulty, continue.     Review of Systems   Constitutional, HEENT, cardiovascular, pulmonary, gi and gu systems are negative, except as otherwise noted.      Objective           Vitals:  No vitals were obtained today due to virtual visit.    Physical Exam   alert and no distress  PSYCH: Alert and oriented times 3; coherent speech, normal   rate and volume, able to articulate logical thoughts, able   to abstract reason, no tangential thoughts, no hallucinations   or delusions  His affect is normal and pleasant  RESP: No cough, no audible wheezing, able to talk in full sentences  Remainder of exam unable to be completed due to telephone  visits                Phone call duration: 15 minutes

## 2023-09-12 NOTE — LETTER
September 12, 2023      Deniz Cortes  [address] [id number]        To Whom It May Concern,     Deniz is under my medical care. His last in person visit and physical evaluation with me was 8/17/23. His last virtual conversation with me was 9/12/23. He has been compliant with my recommendations for his medical care. I have found him to be in sound mind and physical ability to safely operate a motor vehicle.           Sincerely,        EDGAR Lomeli CNP

## 2023-09-12 NOTE — LETTER
September 15, 2023      Deniz Denneybobisaias  [address] [address]        To Whom It May Concern:    Deniz is under my medical care. His last in person visit and physical evaluation with me was 8/17/23. His last virtual conversation with me was 9/12/23. He has been compliant with my recommendations for his medical care. I have found him to be in sound mind and physical ability to safely operate a motor vehicle.         Sincerely,          EDGAR Lomeli CNP

## 2023-10-12 ENCOUNTER — TELEPHONE (OUTPATIENT)
Dept: FAMILY MEDICINE | Facility: CLINIC | Age: 83
End: 2023-10-12
Payer: COMMERCIAL

## 2023-10-12 NOTE — TELEPHONE ENCOUNTER
"  Forms/Letter Request  Patients daughter calling. Carina Davalos wrote patient a letter on 9/15 for the DMV. The DMV is requiring that the following statement is added to the letter and resigned by the provider.     \"Deniz is not required to partake in the skills or knowledge tests.\"     How would you like the form/letter returned:     Could we send this information to you in [facility] or would you prefer to receive a phone call?:   Patient would prefer a phone call   Okay to leave a detailed message?: Yes at Other phone number:  [phone] - Analisa (daughter)     Family would like to  letter today.     "

## 2023-10-12 NOTE — TELEPHONE ENCOUNTER
Analisa notified of letter  Sticky note located and placed on outside of letter  At  [facility] for patient     Ijeoma Herrera RN on 10/12/2023 at 2:55 PM

## 2023-10-12 NOTE — LETTER
October 12, 2023      Deniz Denneycorneliamarkus  [address] [address]        To Whom It May Concern:      Deniz is under my medical care. His last in person visit and physical evaluation with me was 8/17/23. His last virtual conversation with me was 9/12/23. He has been compliant with my recommendations for his medical care. I have found him to be in sound mind and physical ability to safely operate a motor vehicle. He is not required to complete a skills or knowledge test.         Sincerely,          EDGAR Lomeli CNP

## 2023-10-21 ENCOUNTER — APPOINTMENT (OUTPATIENT)
Dept: GENERAL RADIOLOGY | Facility: CLINIC | Age: 83
End: 2023-10-21
Attending: PHYSICIAN ASSISTANT
Payer: COMMERCIAL

## 2023-10-21 ENCOUNTER — HOSPITAL ENCOUNTER (EMERGENCY)
Facility: CLINIC | Age: 83
Discharge: HOME | End: 2023-10-21
Attending: PHYSICIAN ASSISTANT | Admitting: PHYSICIAN ASSISTANT
Payer: COMMERCIAL

## 2023-10-21 VITALS
HEART RATE: 95 BPM | TEMPERATURE: 98.2 F | SYSTOLIC BLOOD PRESSURE: 149 MMHG | OXYGEN SATURATION: 96 % | DIASTOLIC BLOOD PRESSURE: 66 MMHG | RESPIRATION RATE: 16 BRPM

## 2023-10-21 DIAGNOSIS — S61.012A LACERATION OF LEFT THUMB: ICD-10-CM

## 2023-10-21 PROCEDURE — 99213 OFFICE O/P EST LOW 20 MIN: CPT | Mod: 25 | Performed by: PHYSICIAN ASSISTANT

## 2023-10-21 PROCEDURE — 250N000013 HC RX MED GY IP 250 OP 250 PS 637: Performed by: PHYSICIAN ASSISTANT

## 2023-10-21 PROCEDURE — 250N000011 HC RX IP 250 OP 636: Performed by: PHYSICIAN ASSISTANT

## 2023-10-21 PROCEDURE — G0463 HOSPITAL OUTPT CLINIC VISIT: HCPCS | Mod: 25 | Performed by: PHYSICIAN ASSISTANT

## 2023-10-21 PROCEDURE — 90471 IMMUNIZATION ADMIN: CPT | Performed by: PHYSICIAN ASSISTANT

## 2023-10-21 PROCEDURE — 90715 TDAP VACCINE 7 YRS/> IM: CPT | Performed by: PHYSICIAN ASSISTANT

## 2023-10-21 PROCEDURE — 12002 RPR S/N/AX/GEN/TRNK2.6-7.5CM: CPT | Performed by: PHYSICIAN ASSISTANT

## 2023-10-21 PROCEDURE — 73140 X-RAY EXAM OF FINGER(S): CPT | Mod: LT

## 2023-10-21 RX ORDER — ACETAMINOPHEN 325 MG/1
650 TABLET ORAL ONCE
Status: COMPLETED | OUTPATIENT
Start: 2023-10-21 | End: 2023-10-21

## 2023-10-21 RX ORDER — HYDROCODONE BITARTRATE AND ACETAMINOPHEN 5; 325 MG/1; MG/1
1 TABLET ORAL EVERY 6 HOURS PRN
Qty: 10 TABLET | Refills: 0 | Status: SHIPPED | OUTPATIENT
Start: 2023-10-21 | End: 2023-11-02

## 2023-10-21 RX ORDER — CEPHALEXIN 500 MG/1
500 CAPSULE ORAL 3 TIMES DAILY
Qty: 21 CAPSULE | Refills: 0 | Status: SHIPPED | OUTPATIENT
Start: 2023-10-21 | End: 2023-10-28

## 2023-10-21 RX ADMIN — ACETAMINOPHEN 650 MG: 325 TABLET, FILM COATED ORAL at 16:59

## 2023-10-21 RX ADMIN — CEPHALEXIN 500 MG: 250 CAPSULE ORAL at 18:54

## 2023-10-21 RX ADMIN — CLOSTRIDIUM TETANI TOXOID ANTIGEN (FORMALDEHYDE INACTIVATED), CORYNEBACTERIUM DIPHTHERIAE TOXOID ANTIGEN (FORMALDEHYDE INACTIVATED), BORDETELLA PERTUSSIS TOXOID ANTIGEN (GLUTARALDEHYDE INACTIVATED), BORDETELLA PERTUSSIS FILAMENTOUS HEMAGGLUTININ ANTIGEN (FORMALDEHYDE INACTIVATED), BORDETELLA PERTUSSIS PERTACTIN ANTIGEN, AND BORDETELLA PERTUSSIS FIMBRIAE 2/3 ANTIGEN 0.5 ML: 5; 2; 2.5; 5; 3; 5 INJECTION, SUSPENSION INTRAMUSCULAR at 18:18

## 2023-10-21 ASSESSMENT — ENCOUNTER SYMPTOMS
CONSTITUTIONAL NEGATIVE: 1
WOUND: 1
NEUROLOGICAL NEGATIVE: 1

## 2023-10-21 ASSESSMENT — ACTIVITIES OF DAILY LIVING (ADL): ADLS_ACUITY_SCORE: 35

## 2023-10-21 NOTE — DISCHARGE INSTRUCTIONS
Provided Norco for breakthrough pain. Recommend not driving or operating equipment while taking narcotic pain medication. Recommend lots of fluids. May take Miralax while using narcotics to prevent constipation.      Laceration repair was performed as above.  Apply ice for 15-minute intervals over the next 48 hours and use over-the-counter Tylenol for pain control as needed.  Recommended keeping the wound covered and dry for the next 48 hours. After 48 hours, may change dressings and apply bacitracin twice daily for the next 7 days, may allow the wound to be left without a bandage during the night or when there is low risk for contamination. Recommend keeping the wound covered while while there is potential for contamination.  Recommend returning to clinic in 7 to 10 days for suture removal and wound recheck.    Recommend urgent medical evaluation if you develop worsening pain, pain out of proportion to injury, numbness or tingling or loss of feeling, worsening redness/tenderness/swelling in the left thumb or red streaks progressing into the left hand or forearm.

## 2023-10-21 NOTE — ED PROVIDER NOTES
History   No chief complaint on file.    HPI  Deniz Cortes is a 83 year old male with a past medical history of chronic kidney disease stage III, history of CVA (on Plavix), macrocytic anemia, personal history of bladder cancer, and previous intracranial hemorrhage who presents for evaluation of a laceration to the left thumb caused by a table saw earlier today.  States that he just change the blade to his table saw him because it was dull.  Accidentally got his thumb caught in the blade while the blade was running.  Bleeding is controlled with direct pressure but he has soaked several napkins with blood.  He denies any lightheadedness or dizziness.  He is unsure when he had his last Tdap vaccine.    Allergies:  Allergies   Allergen Reactions    Iodinated Contrast Media [Iodinated Contrast Media] Hives     Urticaria/hives from IV contrast for CT imaging 3/20/23    Amoxicillin-Pot Clavulanate Hives    Bee Venom Anaphylaxis    Milk (Cow) GI Disturbance    Pcn [Penicillins] Nausea and Vomiting     vomiting       Problem List:    Patient Active Problem List    Diagnosis Date Noted    Dysphagia 06/29/2023     Priority: Medium    Folate deficiency 06/21/2023     Priority: Medium    H/O: CVA (cerebrovascular accident) 06/21/2023     Priority: Medium    Closed stable burst fracture of third lumbar vertebra with routine healing 06/20/2023     Priority: Medium    Benign essential HTN 06/06/2023     Priority: Medium    Nephrostomy status (H) 02/27/2023     Priority: Medium    Personal history of bladder cancer 09/02/2020     Priority: Medium    CKD (chronic kidney disease) stage 3, GFR 30-59 ml/min (H) 06/28/2019     Priority: Medium    Macrocytic anemia 06/28/2019     Priority: Medium    Advanced directives, counseling/discussion 04/11/2014     Priority: Medium     Patient does not have an Advance/Health Care Directive (HCD), requests blank HCD form.    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 10/18/2007     Priority: Medium        Past Medical History:    Past Medical History:   Diagnosis Date    Bladder cancer (H)        Past Surgical History:    Past Surgical History:   Procedure Laterality Date    CATARACT IOL, RT/LT      Cataract IOL LT    HERNIA REPAIR, INGUINAL RT/LT  2004    right    HERNIA REPAIR, INGUINAL RT/LT  2004    left    IR NEPHROSTOGRAM RIGHT  3/18/2019    IR NEPHROURETERAL CATHETER PLACEMENT RIGHT  3/11/2019    IR NEPHROURETERAL TUBE PLACEMENT RIGHT  3/11/2019    IR NG TUBE PLACEMENT REQ RAD & FLUORO  2020    PHACOEMULSIFICATION WITH STANDARD INTRAOCULAR LENS IMPLANT  2014    Procedure: PHACOEMULSIFICATION WITH STANDARD INTRAOCULAR LENS IMPLANT;  Surgeon: Gerardo Fontana MD;  Location: [facility]    SURGICAL HISTORY OF -   10/2004    left thumb traumatic amputation surgery    SURGICAL HISTORY OF -       left hand surgery       Family History:    Family History   Problem Relation Age of Onset    Cardiovascular Father        Social History:  Marital Status:   [2]  Social History     Tobacco Use    Smoking status: Former     Types: Cigarettes     Quit date: 2007     Years since quittin.4    Smokeless tobacco: Never   Vaping Use    Vaping Use: Never used   Substance Use Topics    Alcohol use: No    Drug use: No        Medications:    cephALEXin (KEFLEX) 500 MG capsule  HYDROcodone-acetaminophen (NORCO) 5-325 MG tablet  acetaminophen (TYLENOL) 500 MG tablet  amLODIPine (NORVASC) 10 MG tablet  clopidogrel (PLAVIX) 75 MG tablet  EPINEPHrine (EPIPEN) 0.3 MG/0.3ML injection  folic acid (FOLVITE) 1 MG tablet          Review of Systems   Constitutional: Negative.    Skin:  Positive for wound.   Neurological: Negative.        Physical Exam   BP: (!) 149/66  Pulse: 95  Temp: 98.2  F (36.8  C)  Resp: 16  SpO2: 96 %      Physical Exam  Constitutional:       General: He is not in acute distress.     Appearance:  Normal appearance. He is not ill-appearing, toxic-appearing or diaphoretic.   Cardiovascular:      Pulses: Normal pulses.           Radial pulses are 2+ on the right side and 2+ on the left side.   Skin:     General: Skin is warm and dry.      Findings: Laceration present. No rash.      Comments: Laceration on the palmar aspect of the left thumb. 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  Laceration is 3 mm to 4 mm deep.  Significant bleeding from wound unless pressure is applied.  Has normal sensation to light touch in the left thumb.  Strength is 5/5 in the left hand and left thumb.   Neurological:      Mental Status: He is alert and oriented to person, place, and time.      Sensory: No sensory deficit.         ED [facility] [place]    -Laceration Repair    Date/Time: 10/21/2023 6:39 PM    Performed by: Dudley Fernando PA-C  Authorized by: Dudley Fernando PA-C    Risks, benefits and alternatives discussed.      ANESTHESIA (see MAR for exact dosages):     Anesthesia method:  Local infiltration    Local anesthetic:  Bupivacaine 0.5% w/o epi  LACERATION DETAILS     Location:  Finger    Finger location:  L thumb    Length (cm):  3    Depth (mm):  4    REPAIR TYPE:     Repair type:  Simple    EXPLORATION:     Hemostasis achieved with:  Direct pressure    Wound exploration: wound explored through full range of motion and entire depth of wound probed and visualized      Wound extent: areolar tissue violated and fascia violated      Wound extent: no foreign body, no signs of injury, no nerve damage, no tendon damage, no underlying fracture and no vascular damage      Contaminated: yes      TREATMENT:     Area cleansed with:  Betadine    Irrigation solution:  Sterile saline    Irrigation volume:  150    Irrigation method:  Pressure wash and syringe    Visualized foreign bodies/material removed: no      SKIN REPAIR     Repair method:  Sutures     Suture size:  4-0    Suture material:  Nylon    Suture technique:  Simple interrupted    Number of sutures:  4    APPROXIMATION     Approximation:  Loose    POST-PROCEDURE DETAILS     Dressing:  Antibiotic ointment and tube gauze      PROCEDURE    Patient Tolerance:  Patient tolerated the procedure well with no immediate complications              Results for orders placed or performed during the hospital encounter of 10/21/23 (from the past 24 hour(s))   Fingers XR, 2-3 views, left    Narrative    EXAM: XR FINGER LEFT G/E 2 VIEWS  LOCATION: [facility]  DATE: 10/21/2023    INDICATION: Laceration to the tip of the left thumb, caused by a table saw blade.  COMPARISON: None.      Impression    IMPRESSION: No acute fracture. 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 There are 2 tiny foreign bodies overlying the remaining portion of the distal phalanx. Cannulated screw in the distal   portion of the second metacarpal neck. Moderate degenerative changes at the first CMC joint.         Medications   cephALEXin (KEFLEX) capsule 500 mg (500 mg Oral $Given 10/21/23 1854)   acetaminophen (TYLENOL) tablet 650 mg (650 mg Oral $Given 10/21/23 1659)   Tdap (tetanus-diphtheria-acell pertussis) (ADACEL) injection 0.5 mL (0.5 mLs Intramuscular $Given 10/21/23 1818)       Assessments & Plan (with Medical Decision Making)     The patient is an 83-year-old male with a past medical history of CVA on Plavix who presents for evaluation of a laceration of the left thumb which occurred earlier today.  Injury was caused by a table saw blade.  Bleeding has been controlled with direct pressure.  Tdap was updated today.  No neuromuscular deficits on exam.  Provided referral to orthopedics for further evaluation management.  Starting the patient on oral Keflex today for wound prophylaxis.  He does have an allergy to Augmentin, reaction is hives.  However  no angioedema or anaphylaxis.  Also has a reaction to penicillins nausea/vomiting.  Considering a reaction to Keflex is 10% or less and Keflex is preferred for wound prophylaxis, I feel benefit outweighs risk.  Side effects discussed.  Recommended that the patient discontinue Keflex immediately if he develops a rash or other side effects such as facial swelling or tongue swelling or breathing concerns.    Provided Norco for breakthrough pain. Recommend not driving or operating equipment while taking narcotic pain medication. Recommend lots of fluids. May take Miralax while using narcotics to prevent constipation.      Laceration repair was performed as above.  Apply ice for 15-minute intervals over the next 48 hours and use over-the-counter Tylenol for pain control as needed.  Recommended keeping the wound covered and dry for the next 48 hours. After 48 hours, may change dressings and apply bacitracin twice daily for the next 7 days, may allow the wound to be left without a bandage during the night or when there is low risk for contamination. Recommend keeping the wound covered while while there is potential for contamination.  Recommend returning to clinic in 7 to 10 days for suture removal and wound recheck.    Recommend urgent medical evaluation if you develop worsening pain, pain out of proportion to injury, numbness or tingling or loss of feeling, worsening redness/tenderness/swelling in the left thumb or red streaks progressing into the left hand or forearm.      I have reviewed the nursing notes.    I have reviewed the findings, diagnosis, plan and need for follow up with the patient.      New Prescriptions    CEPHALEXIN (KEFLEX) 500 MG CAPSULE    Take 1 capsule (500 mg) by mouth 3 times daily for 7 days    HYDROCODONE-ACETAMINOPHEN (NORCO) 5-325 MG TABLET    Take 1 tablet by mouth every 6 hours as needed for severe pain       Final diagnoses:   Laceration of left thumb       10/21/2023   [facility]  [facility]       Dudley Fernando PA-C  10/21/23 0494

## 2023-10-23 DIAGNOSIS — I10 ESSENTIAL (PRIMARY) HYPERTENSION: ICD-10-CM

## 2023-11-02 ENCOUNTER — OFFICE VISIT (OUTPATIENT)
Dept: FAMILY MEDICINE | Facility: CLINIC | Age: 83
End: 2023-11-02
Payer: COMMERCIAL

## 2023-11-02 VITALS
HEART RATE: 77 BPM | RESPIRATION RATE: 20 BRPM | DIASTOLIC BLOOD PRESSURE: 64 MMHG | OXYGEN SATURATION: 97 % | TEMPERATURE: 98 F | BODY MASS INDEX: 25.11 KG/M2 | WEIGHT: 160 LBS | SYSTOLIC BLOOD PRESSURE: 132 MMHG | HEIGHT: 67 IN

## 2023-11-02 DIAGNOSIS — Z48.02 VISIT FOR SUTURE REMOVAL: Primary | ICD-10-CM

## 2023-11-02 DIAGNOSIS — Z86.73 H/O: CVA (CEREBROVASCULAR ACCIDENT): ICD-10-CM

## 2023-11-02 DIAGNOSIS — S06.35AD: ICD-10-CM

## 2023-11-02 PROCEDURE — 99214 OFFICE O/P EST MOD 30 MIN: CPT | Performed by: FAMILY MEDICINE

## 2023-11-02 RX ORDER — RESPIRATORY SYNCYTIAL VIRUS VACCINE 120MCG/0.5
0.5 KIT INTRAMUSCULAR ONCE
Qty: 1 EACH | Refills: 0 | Status: CANCELLED | OUTPATIENT
Start: 2023-11-02 | End: 2023-11-02

## 2023-11-02 ASSESSMENT — ENCOUNTER SYMPTOMS
COUGH: 0
NAUSEA: 0
DIARRHEA: 0
FREQUENCY: 1
MYALGIAS: 0
PARESTHESIAS: 0
EYE PAIN: 0
SORE THROAT: 0
HEMATURIA: 0
WEAKNESS: 0
NERVOUS/ANXIOUS: 0
DIZZINESS: 0
HEADACHES: 0
PALPITATIONS: 0
HEARTBURN: 0
DYSURIA: 0
FEVER: 0
SHORTNESS OF BREATH: 0
CHILLS: 0
JOINT SWELLING: 0
HEMATOCHEZIA: 0
ABDOMINAL PAIN: 0
CONSTIPATION: 1
ARTHRALGIAS: 0

## 2023-11-02 ASSESSMENT — ACTIVITIES OF DAILY LIVING (ADL): CURRENT_FUNCTION: NO ASSISTANCE NEEDED

## 2023-11-02 ASSESSMENT — PAIN SCALES - GENERAL: PAINLEVEL: NO PAIN (0)

## 2023-11-02 NOTE — PROGRESS NOTES
"  Assessment & Plan     Visit for suture removal   Left thumb  4 interrupted sutures removed  Dressing replaced  No signs of infection    H/O: CVA (cerebrovascular accident)   Wife with concerns about memory and \"spells\" of mental status changes  They didn't seem to understand that he had     Needs follow up with PCP for annual wellness and memory concerns  May need neurology/neurpsych evaluation  Apparently OT noted he should not be driving  Wife concerned due to her macular degeneration and his loss of driving.     She is also trying to keep him out of the garage which is making him angry    Discussed that they may need to consider assisted living or getting more help at home.  Daughter lives an hour away apparently.       Michelle Damon MD  [facility]    Subjective   Sarbjit is a 83 year old, presenting for the following health issues:  Medicare Visit      11/2/2023     3:37 PM   Additional Questions   Roomed by Amber CAMARENA CMA   Accompanied by Wife       HPI     Needs sutures removed, placed 10/21/2023 in the ER after a table saw accident    Wife concerned about his medications.        Current Outpatient Medications:     amLODIPine (NORVASC) 10 MG tablet, Take 1 Tablet (10 mg) by mouth daily. Indications: High Blood Pressure Disorder, Disp: , Rfl:     clopidogrel (PLAVIX) 75 MG tablet, Take 1 tablet (75 mg) by mouth daily, Disp: 90 tablet, Rfl: 3    folic acid (FOLVITE) 1 MG tablet, Take 1 Tablet (1 mg) by mouth daily. Indications: Anemia From Inadequate Folic Acid, Disp: , Rfl:     acetaminophen (TYLENOL) 500 MG tablet, Take 1-2 tablets (500-1,000 mg) by mouth every 8 hours as needed for mild pain, Disp: , Rfl:       Patient is NOT on a statin, given his CVA, this should be considered  Wife wants him on LESS medication not more.  He is also not on an aspirin.         Review of Systems   Constitutional, HEENT, cardiovascular, pulmonary, gi and gu systems are negative, except as otherwise " "noted.      Objective    /64   Pulse 77   Temp 98  F (36.7  C) (Tympanic)   Resp 20   Ht 1.702 m (5' 7\")   Wt 72.6 kg (160 lb)   SpO2 97%   BMI 25.06 kg/m    Body mass index is 25.06 kg/m .  Physical Exam   GENERAL APPEARANCE: alert and no distress  SKIN: see photo      Sutures removed without difficulty    Dressing replaced with bacitracin, non-stick dressing and coban.                   "

## 2023-11-02 NOTE — PATIENT INSTRUCTIONS
Patient Education   Personalized Prevention Plan  You are due for the preventive services outlined below.  Your care team is available to assist you in scheduling these services.  If you have already completed any of these items, please share that information with your care team to update in your medical record.  Health Maintenance Due   Topic Date Due     Cholesterol Lab  Never done     Kidney Microalbumin Urine Test  Never done     Urine Test  Never done     Zoster (Shingles) Vaccine (1 of 2) Never done     RSV VACCINE (Pregnancy & 60+) (1 - 1-dose 60+ series) Never done     Discuss Advance Care Planning  04/11/2019     Pneumococcal Vaccine (2 - PPSV23 or PCV20) 05/09/2019     Flu Vaccine (1) 09/01/2023     COVID-19 Vaccine (2 - 2023-24 season) 09/01/2023

## 2023-11-02 NOTE — PROGRESS NOTES
"SUBJECTIVE:   Sarbjit is a 83 year old who presents for Preventive Visit.      11/2/2023     3:37 PM   Additional Questions   Roomed by Amber CAMARENA CMA   Accompanied by Wife       Are you in the first 12 months of your Medicare coverage?  No    Healthy Habits:     In general, how would you rate your overall health?  Good    Frequency of exercise:  2-3 days/week    Duration of exercise:  Less than 15 minutes    Do you usually eat at least 4 servings of fruit and vegetables a day, include whole grains    & fiber and avoid regularly eating high fat or \"junk\" foods?  Yes    Taking medications regularly:  Yes    Medication side effects:  Not applicable    Ability to successfully perform activities of daily living:  No assistance needed    Home Safety:  No safety concerns identified    Hearing Impairment:  No hearing concerns    In the past 6 months, have you been bothered by leaking of urine?  No    In general, how would you rate your overall mental or emotional health?  Good    Additional concerns today:  Yes          Have you ever done Advance Care Planning? (For example, a Health Directive, POLST, or a discussion with a medical provider or your loved ones about your wishes): Yes, advance care planning is on file.    {Hearing Test Done (Optional):697220}   Fall risk  Fallen 2 or more times in the past year?: No  Any fall with injury in the past year?: No    Cognitive Screening   1) Repeat 3 items (Leader, Season, Table)    2) Clock draw: ABNORMAL incorrect hand placement  3) 3 item recall: Recalls 1 object   Results: ABNORMAL clock, 1-2 items recalled: PROBABLE COGNITIVE IMPAIRMENT, **INFORM PROVIDER**    Mini-CogTM Copyright [employer]. Licensed by the author for use in [facility]; reprinted with permission ([email or web address]). All rights reserved.      Do you have sleep apnea, excessive snoring or daytime drowsiness? : no    Reviewed and updated as needed this visit by clinical staff   Tobacco  Allergies  Meds       "        Reviewed and updated as needed this visit by Provider                 Social History     Tobacco Use    Smoking status: Former     Types: Cigarettes     Quit date: 2007     Years since quittin.4    Smokeless tobacco: Never   Substance Use Topics    Alcohol use: No     {Rooming staff  Click this link to complete the Prescreen if response below is not for today's visit  Alcohol Use Prescreen >3 drinks/day or > 7 drinks/week.  If the prescreen question answer is YES, complete the full AUDIT  :872988}        2023     3:46 PM   Alcohol Use   Prescreen: >3 drinks/day or >7 drinks/week? No     Do you have a current opioid prescription? No  Do you use any other controlled substances or medications that are not prescribed by a provider? None  {Provider  If there are gaps in the social history shown above, please follow the link and refresh the note Link to Social and Substance History :633133}    - Memory concerns.    - Easy bruising.    - Stress/depression since not being able to drive.    ED/UC Followup:    [facility]ility[facility]  Date of visit: 10/21/2023  Reason for visit: Laceration of left thumb  Current Status: Area is healing well, no bleeding, drainage, redness or swelling. No fevers.      Current providers sharing in care for this patient include:   Patient Care Team:  Deniz Bah MD as PCP - General (HOSPITALIST)  Carina Davalos APRN CNP as Assigned PCP    The following health maintenance items are reviewed in Epic and correct as of today:  Health Maintenance   Topic Date Due    LIPID  Never done    MICROALBUMIN  Never done    URINALYSIS  Never done    ZOSTER IMMUNIZATION (1 of 2) Never done    RSV VACCINE (Pregnancy & 60+) (1 - 1-dose 60+ series) Never done    Pneumococcal Vaccine: 65+ Years (2 - PPSV23 or PCV20) 2019    INFLUENZA VACCINE (1) 2023    COVID-19 Vaccine (2 - -24 season) 2023    BMP  2024    HEMOGLOBIN  2024     "ANNUAL REVIEW OF HM ORDERS  09/12/2024    MEDICARE ANNUAL WELLNESS VISIT  11/02/2024    FALL RISK ASSESSMENT  11/02/2024    ADVANCE CARE PLANNING  11/02/2028    DTAP/TDAP/TD IMMUNIZATION (2 - Td or Tdap) 10/21/2033    PHQ-2 (once per calendar year)  Completed    IPV IMMUNIZATION  Aged Out    HPV IMMUNIZATION  Aged Out    MENINGITIS IMMUNIZATION  Aged Out    RSV MONOCLONAL ANTIBODY  Aged Out    LUNG CANCER SCREENING  Discontinued     {Chronicprobdata (optional):907337}  {Decision Support (Optional):752310}        Review of Systems   Constitutional:  Negative for chills and fever.   HENT:  Positive for hearing loss. Negative for congestion, ear pain and sore throat.    Eyes:  Negative for pain and visual disturbance.   Respiratory:  Negative for cough and shortness of breath.    Cardiovascular:  Negative for chest pain, palpitations and peripheral edema.   Gastrointestinal:  Positive for constipation. Negative for abdominal pain, diarrhea, heartburn, hematochezia and nausea.   Genitourinary:  Positive for frequency and urgency. Negative for dysuria, genital sores, hematuria, impotence and penile discharge.   Musculoskeletal:  Negative for arthralgias, joint swelling and myalgias.   Skin:  Negative for rash.   Neurological:  Negative for dizziness, weakness, headaches and paresthesias.   Psychiatric/Behavioral:  Negative for mood changes. The patient is not nervous/anxious.      {ROS COMP (Optional):674217}    OBJECTIVE:   /64   Pulse 77   Temp 98  F (36.7  C) (Tympanic)   Resp 20   Ht 1.702 m (5' 7\")   Wt 72.6 kg (160 lb)   SpO2 97%   BMI 25.06 kg/m   Estimated body mass index is 25.06 kg/m  as calculated from the following:    Height as of this encounter: 1.702 m (5' 7\").    Weight as of this encounter: 72.6 kg (160 lb).  Physical Exam  {Exam (Optional) :386546}    {Diagnostic Test Results (Optional):188793}    ASSESSMENT / PLAN:   {Diag Picklist:915264}    {Patient advised of split billing " (Optional):787345}      COUNSELING:  {Medicare Counselin}        He reports that he quit smoking about 16 years ago. His smoking use included cigarettes. He has never used smokeless tobacco.      Appropriate preventive services were discussed with this patient, including applicable screening as appropriate for fall prevention, nutrition, physical activity, Tobacco-use cessation, weight loss and cognition.  Checklist reviewing preventive services available has been given to the patient.    Reviewed patients plan of care and provided an AVS. The {CarePlan:910941} for Deniz meets the Care Plan requirement. This Care Plan has been established and reviewed with the {PATIENT, FAMILY MEMBER, CAREGIVER:199659}.    {Counseling Resources  [place] Preventive Services Task Force  Cholesterol Screening  Health diet/nutrition  Pooled Cohorts Equation Calculator  USDA's MyPlate  ASA Prophylaxis  Lung CA Screening  Osteoporosis prevention/bone health :617510}  {Prostate Cancer Screening  Consider for men 55-69 per guidance from USPSTF :528633}    Michelle Damon MD  [facility]    Identified Health Risks:  {Medicare required documentation of substance and opioid use disorders screening :531055}

## 2023-11-07 RX ORDER — AMLODIPINE BESYLATE 10 MG/1
10 TABLET ORAL DAILY
Qty: 90 TABLET | Refills: 0 | OUTPATIENT
Start: 2023-11-07

## 2023-11-29 DIAGNOSIS — I10 ESSENTIAL (PRIMARY) HYPERTENSION: ICD-10-CM

## 2023-11-30 RX ORDER — AMLODIPINE BESYLATE 10 MG/1
10 TABLET ORAL DAILY
Qty: 90 TABLET | Refills: 0 | Status: SHIPPED | OUTPATIENT
Start: 2023-11-30 | End: 2024-02-27

## 2023-11-30 NOTE — TELEPHONE ENCOUNTER
Previously managed by last pcp.    amLODIPine (NORVASC) 10 mg tablet   Indications: HTN (hypertension) Take 1 Tablet (10 mg) by mouth daily. Indications: High Blood Pressure Disorder   Annemarie Hawthorne MSN, RN

## 2024-02-27 DIAGNOSIS — I10 ESSENTIAL (PRIMARY) HYPERTENSION: ICD-10-CM

## 2024-02-27 RX ORDER — AMLODIPINE BESYLATE 10 MG/1
10 TABLET ORAL DAILY
Qty: 90 TABLET | Refills: 3 | Status: SHIPPED | OUTPATIENT
Start: 2024-02-27 | End: 2024-07-16

## 2024-02-27 NOTE — TELEPHONE ENCOUNTER
Requested Prescriptions   Pending Prescriptions Disp Refills    amLODIPine (NORVASC) 10 MG tablet [Pharmacy Med Name: amlodipine 10 mg tablet] 90 tablet 0     Sig: TAKE 1 TABLET BY MOUTH EVERY DAY       Calcium Channel Blockers Protocol  Failed - 2/27/2024 10:18 AM        Failed - Normal serum creatinine on file in past 12 months     Recent Labs   Lab Test 03/24/23  1636   CR 1.81*       Ok to refill medication if creatinine is low          Passed - Blood pressure under 140/90 in past 12 months     BP Readings from Last 3 Encounters:   11/02/23 132/64   10/21/23 (!) 149/66   08/17/23 130/62                 Passed - Recent (12 mo) or future (30 days) visit within the authorizing provider's specialty     The patient must have completed an in-person or virtual visit within the past 12 months or has a future visit scheduled within the next 90 days with the authorizing provider s specialty.  Urgent care and e-visits do not quality as an office visit for this protocol.          Passed - Medication is active on med list        Passed - Patient is age 18 or older

## 2024-06-28 ENCOUNTER — TELEPHONE (OUTPATIENT)
Dept: FAMILY MEDICINE | Facility: CLINIC | Age: 84
End: 2024-06-28
Payer: COMMERCIAL

## 2024-06-28 DIAGNOSIS — I10 ESSENTIAL (PRIMARY) HYPERTENSION: ICD-10-CM

## 2024-06-28 NOTE — TELEPHONE ENCOUNTER
Medication Question or Refill    Contacts       Contact Date/Time Type Contact Phone/Fax    06/28/2024 02:15 PM CDT Phone (Incoming) Sarbjit Cortes (Self)             What medication are you calling about (include dose and sig)?: folic acid    Preferred Pharmacy:       [facility] [id number] [place] [id number]  Phone: [phone] Fax: [phone]          Controlled Substance Agreement on file:   CSA -- Patient Level:    CSA: None found at the patient level.       Who prescribed the medication?: pcp    Do you need a refill? Yes    When did you use the medication last? everyday    Patient offered an appointment? Yes: 7/16    Do you have any questions or concerns?  Yes: only 6 pills left      Could we send this information to you in [facility] or would you prefer to receive a phone call?:   Patient would prefer a phone call   Okay to leave a detailed message?: Yes at Home number on file [phone] (home)

## 2024-06-28 NOTE — TELEPHONE ENCOUNTER
Requested Prescriptions   Pending Prescriptions Disp Refills    folic acid (FOLVITE) 1 MG tablet [Pharmacy Med Name: folic acid 1 mg tablet] 30 tablet 0     Sig: TAKE 1 TABLET BY MOUTH EVERY DAY       Vitamin Supplements Protocol Failed - 6/28/2024  2:23 PM        Failed - Medication indicated for associated diagnosis     The medication is prescribed for one or more of the following conditions:     Vitamin deficiency            Passed - The patient does not have an order for high-dose vitamin D        Passed - Medication is active on med list        Passed - The patient is 2 years of age or older        Passed - Recent (12 mo) or future (90 days) visit within the authorizing provider's specialty     The patient must have completed an in-person or virtual visit within the past 12 months or has a future visit scheduled within the next 90 days with the authorizing provider s specialty.  Urgent care and e-visits do not quality as an office visit for this protocol.             Please call patient back.    Please advise.    Coni Aguirre RN on 6/28/2024 at 3:38 PM

## 2024-06-28 NOTE — TELEPHONE ENCOUNTER
Please see refill request for further documentation that was sent to PCP for refill request.    Coni Aguirre RN on 6/28/2024 at 3:40 PM

## 2024-07-01 RX ORDER — FOLIC ACID 1 MG/1
1000 TABLET ORAL DAILY
Qty: 90 TABLET | Refills: 3 | Status: SHIPPED | OUTPATIENT
Start: 2024-07-01 | End: 2024-07-16

## 2024-07-01 NOTE — TELEPHONE ENCOUNTER
Routing refill request to provider for review/approval because:  Medication is reported/historical    Pending Prescriptions:                       Disp   Refills    folic acid (FOLVITE) 1 MG tablet [Pharmac*30 tab*0            Sig: TAKE 1 TABLET BY MOUTH EVERY DAY      Requested Prescriptions   Pending Prescriptions Disp Refills    folic acid (FOLVITE) 1 MG tablet [Pharmacy Med Name: folic acid 1 mg tablet] 30 tablet 0     Sig: TAKE 1 TABLET BY MOUTH EVERY DAY       Vitamin Supplements Protocol Failed - 6/28/2024  3:38 PM        Failed - Medication indicated for associated diagnosis     The medication is prescribed for one or more of the following conditions:     Vitamin deficiency            Passed - The patient does not have an order for high-dose vitamin D        Passed - Medication is active on med list        Passed - The patient is 2 years of age or older        Passed - Recent (12 mo) or future (90 days) visit within the authorizing provider's specialty     The patient must have completed an in-person or virtual visit within the past 12 months or has a future visit scheduled within the next 90 days with the authorizing provider s specialty.  Urgent care and e-visits do not quality as an office visit for this protocol.             Ijeoma Herrera RN on 7/1/2024 at 10:46 AM

## 2024-07-16 ENCOUNTER — OFFICE VISIT (OUTPATIENT)
Dept: FAMILY MEDICINE | Facility: CLINIC | Age: 84
End: 2024-07-16
Payer: COMMERCIAL

## 2024-07-16 VITALS
DIASTOLIC BLOOD PRESSURE: 54 MMHG | HEART RATE: 64 BPM | RESPIRATION RATE: 16 BRPM | OXYGEN SATURATION: 96 % | BODY MASS INDEX: 26.06 KG/M2 | HEIGHT: 67 IN | SYSTOLIC BLOOD PRESSURE: 112 MMHG | WEIGHT: 166 LBS | TEMPERATURE: 98.8 F

## 2024-07-16 DIAGNOSIS — I10 ESSENTIAL (PRIMARY) HYPERTENSION: ICD-10-CM

## 2024-07-16 DIAGNOSIS — N18.32 STAGE 3B CHRONIC KIDNEY DISEASE (H): ICD-10-CM

## 2024-07-16 DIAGNOSIS — Z86.73 H/O: CVA (CEREBROVASCULAR ACCIDENT): ICD-10-CM

## 2024-07-16 DIAGNOSIS — N18.31 STAGE 3A CHRONIC KIDNEY DISEASE (H): Primary | ICD-10-CM

## 2024-07-16 DIAGNOSIS — Z91.038 HISTORY OF ANAPHYLACTIC SHOCK DUE TO INSECT STING: ICD-10-CM

## 2024-07-16 LAB
ANION GAP SERPL CALCULATED.3IONS-SCNC: 13 MMOL/L (ref 7–15)
BUN SERPL-MCNC: 24.1 MG/DL (ref 8–23)
CALCIUM SERPL-MCNC: 8.8 MG/DL (ref 8.8–10.4)
CHLORIDE SERPL-SCNC: 105 MMOL/L (ref 98–107)
CHOLEST SERPL-MCNC: 96 MG/DL
CREAT SERPL-MCNC: 1.83 MG/DL (ref 0.67–1.17)
EGFRCR SERPLBLD CKD-EPI 2021: 36 ML/MIN/1.73M2
FASTING STATUS PATIENT QL REPORTED: ABNORMAL
FASTING STATUS PATIENT QL REPORTED: NORMAL
GLUCOSE SERPL-MCNC: 113 MG/DL (ref 70–99)
HCO3 SERPL-SCNC: 21 MMOL/L (ref 22–29)
HDLC SERPL-MCNC: 46 MG/DL
HGB BLD-MCNC: 12.5 G/DL (ref 13.3–17.7)
LDLC SERPL CALC-MCNC: 31 MG/DL
NONHDLC SERPL-MCNC: 50 MG/DL
POTASSIUM SERPL-SCNC: 4.3 MMOL/L (ref 3.4–5.3)
SODIUM SERPL-SCNC: 139 MMOL/L (ref 135–145)
TRIGL SERPL-MCNC: 95 MG/DL

## 2024-07-16 PROCEDURE — 36415 COLL VENOUS BLD VENIPUNCTURE: CPT | Performed by: NURSE PRACTITIONER

## 2024-07-16 PROCEDURE — 99214 OFFICE O/P EST MOD 30 MIN: CPT | Performed by: NURSE PRACTITIONER

## 2024-07-16 PROCEDURE — 80061 LIPID PANEL: CPT | Performed by: NURSE PRACTITIONER

## 2024-07-16 PROCEDURE — 80048 BASIC METABOLIC PNL TOTAL CA: CPT | Performed by: NURSE PRACTITIONER

## 2024-07-16 PROCEDURE — 85018 HEMOGLOBIN: CPT | Performed by: NURSE PRACTITIONER

## 2024-07-16 RX ORDER — ATORVASTATIN CALCIUM 40 MG/1
40 TABLET, FILM COATED ORAL DAILY
Qty: 90 TABLET | Refills: 3 | Status: SHIPPED | OUTPATIENT
Start: 2024-07-16

## 2024-07-16 RX ORDER — AMLODIPINE BESYLATE 10 MG/1
10 TABLET ORAL DAILY
Qty: 90 TABLET | Refills: 3 | Status: SHIPPED | OUTPATIENT
Start: 2024-07-16

## 2024-07-16 RX ORDER — ASPIRIN 81 MG/1
81 TABLET, CHEWABLE ORAL
COMMUNITY
Start: 2023-07-07

## 2024-07-16 RX ORDER — EPINEPHRINE 0.3 MG/.3ML
INJECTION SUBCUTANEOUS
Qty: 2 EACH | Refills: 1 | Status: SHIPPED | OUTPATIENT
Start: 2024-07-16 | End: 2024-08-22

## 2024-07-16 RX ORDER — ATORVASTATIN CALCIUM 40 MG/1
40 TABLET, FILM COATED ORAL DAILY
COMMUNITY
End: 2024-07-16

## 2024-07-16 RX ORDER — FOLIC ACID 1 MG/1
1000 TABLET ORAL DAILY
Qty: 90 TABLET | Refills: 3 | Status: SHIPPED | OUTPATIENT
Start: 2024-07-16

## 2024-07-16 ASSESSMENT — PATIENT HEALTH QUESTIONNAIRE - PHQ9
SUM OF ALL RESPONSES TO PHQ QUESTIONS 1-9: 10
SUM OF ALL RESPONSES TO PHQ QUESTIONS 1-9: 10
10. IF YOU CHECKED OFF ANY PROBLEMS, HOW DIFFICULT HAVE THESE PROBLEMS MADE IT FOR YOU TO DO YOUR WORK, TAKE CARE OF THINGS AT HOME, OR GET ALONG WITH OTHER PEOPLE: SOMEWHAT DIFFICULT

## 2024-07-16 NOTE — PROGRESS NOTES
"  Assessment & Plan     Stage 3a chronic kidney disease (H)  Monitoring  - Albumin Random Urine Quantitative with Creat Ratio; Future  - BASIC METABOLIC PANEL; Future  - Hemoglobin; Future  - Albumin Random Urine Quantitative with Creat Ratio  - BASIC METABOLIC PANEL  - Hemoglobin    Essential (primary) hypertension    - amLODIPine (NORVASC) 10 MG tablet; Take 1 tablet (10 mg) by mouth daily  - folic acid (FOLVITE) 1 MG tablet; Take 1 tablet (1,000 mcg) by mouth daily    H/O: CVA (cerebrovascular accident)    - Lipid panel reflex to direct LDL Non-fasting; Future  - atorvastatin (LIPITOR) 40 MG tablet; Take 1 tablet (40 mg) by mouth daily  - Home Care Referral  - Lipid panel reflex to direct LDL Non-fasting    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 1 day supply    Time spent in chart review in preparation to see patient, time with patient for interview/exam, ordering medications/tests/and/or procedures, and time spent in charting and coordinating care: 30 minutes.         BMI  Estimated body mass index is 26 kg/m  as calculated from the following:    Height as of this encounter: 1.702 m (5' 7\").    Weight as of this encounter: 75.3 kg (166 lb).       Depression Screening Follow Up        7/16/2024     2:02 PM   PHQ   PHQ-9 Total Score 10   Q9: Thoughts of better off dead/self-harm past 2 weeks Not at all           Follow Up Actions Taken  Patient counseled, no additional follow up at this time.       FUTURE APPOINTMENTS:       - Follow-up for annual visit or as needed    Subjective   Sarbjit is a 84 year old, presenting for the following health issues:  Recheck Medication        7/16/2024     2:06 PM   Additional Questions   Roomed by Ashia RALPH     History of Present Illness " "      Reason for visit:  Medication update    He eats 2-3 servings of fruits and vegetables daily.He consumes 0 sweetened beverage(s) daily.He exercises with enough effort to increase his heart rate 9 or less minutes per day.  He exercises with enough effort to increase his heart rate 4 days per week.   He is taking medications regularly.         Hypertension Follow-up    Do you check your blood pressure regularly outside of the clinic? No   Are you following a low salt diet? Yes  Are your blood pressures ever more than 140 on the top number (systolic) OR more   than 90 on the bottom number (diastolic), for example 140/90? No    Chronic Kidney Disease Follow-up    Do you take any over the counter pain medicine?: No    Cerebrovascular Follow-up    Patient history: ischemic cerebrovascular incident  Residual symptoms: Trouble with reading and memory. Trouble with dexterity/ changing urostomy appliance.  Worsened or new symptoms since last visit: No  Daily aspirin use: Yes  Hypertension controlled: Yes        Review of Systems  Constitutional, HEENT, cardiovascular, pulmonary, gi and gu systems are negative, except as otherwise noted.      Objective    /54   Pulse 64   Temp 98.8  F (37.1  C) (Tympanic)   Resp 16   Ht 1.702 m (5' 7\")   Wt 75.3 kg (166 lb)   SpO2 96%   BMI 26.00 kg/m    Body mass index is 26 kg/m .  Physical Exam   GENERAL: alert and no distress  RESP: lungs clear to auscultation - no rales, rhonchi or wheezes  CV: regular rate and rhythm, normal S1 S2, no S3 or S4, no murmur, click or rub, no peripheral edema  MS: no gross musculoskeletal defects noted, no edema  NEURO: Normal strength and tone, mentation intact and speech normal            Signed Electronically by: EDGAR Lomeli CNP    "

## 2024-07-16 NOTE — LETTER
July 25, 2024      Sarbjit Cortes  [address] [facility] [address]        Dear ,    We are writing to inform you of your test results.    Your kidney function is declining. I recommend to see a kidney specialist. I will put a referral in and have the  call you. [facility] will call you to coordinate your care as prescribed by the provider.  If you don't hear from a representative within 2 business days, please call [phone]     Resulted Orders   Lipid panel reflex to direct LDL Non-fasting   Result Value Ref Range    Cholesterol 96 <200 mg/dL    Triglycerides 95 <150 mg/dL    Direct Measure HDL 46 >=40 mg/dL    LDL Cholesterol Calculated 31 <=100 mg/dL    Non HDL Cholesterol 50 <130 mg/dL    Patient Fasting > 8hrs? Unknown     Narrative    Cholesterol  Desirable:  <200 mg/dL    Triglycerides  Normal:  Less than 150 mg/dL  Borderline High:  150-199 mg/dL  High:  200-499 mg/dL  Very High:  Greater than or equal to 500 mg/dL    Direct Measure HDL  Female:  Greater than or equal to 50 mg/dL   Male:  Greater than or equal to 40 mg/dL    LDL Cholesterol  Desirable:  <100mg/dL  Above Desirable:  100-129 mg/dL   Borderline High:  130-159 mg/dL   High:  160-189 mg/dL   Very High:  >= 190 mg/dL    Non HDL Cholesterol  Desirable:  130 mg/dL  Above Desirable:  130-159 mg/dL  Borderline High:  160-189 mg/dL  High:  190-219 mg/dL  Very High:  Greater than or equal to 220 mg/dL   Albumin Random Urine Quantitative with Creat Ratio   Result Value Ref Range    Creatinine Urine mg/dL 98.0 mg/dL      Comment:      The reference ranges have not been established in urine creatinine. The results should be integrated into the clinical context for interpretation.    Albumin Urine mg/L 145.7 mg/L      Comment:      The reference ranges have not been established in urine albumin. The results should be integrated into the clinical context for interpretation.    Albumin Urine mg/g Cr 148.67 (H)  0.00 - 17.00 mg/g Cr      Comment:      Microalbuminuria is defined as an albumin:creatinine ratio of 17 to 299 for males and 25 to 299 for females. A ratio of albumin:creatinine of 300 or higher is indicative of overt proteinuria.  Due to biologic variability, positive results should be confirmed by a second, first-morning random or 24-hour timed urine specimen. If there is discrepancy, a third specimen is recommended. When 2 out of 3 results are in the microalbuminuria range, this is evidence for incipient nephropathy and warrants increased efforts at glucose control, blood pressure control, and institution of therapy with an angiotensin-converting-enzyme (ACE) inhibitor (if the patient can tolerate it).     BASIC METABOLIC PANEL   Result Value Ref Range    Sodium 139 135 - 145 mmol/L    Potassium 4.3 3.4 - 5.3 mmol/L    Chloride 105 98 - 107 mmol/L    Carbon Dioxide (CO2) 21 (L) 22 - 29 mmol/L    Anion Gap 13 7 - 15 mmol/L    Urea Nitrogen 24.1 (H) 8.0 - 23.0 mg/dL    Creatinine 1.83 (H) 0.67 - 1.17 mg/dL    GFR Estimate 36 (L) >60 mL/min/1.73m2      Comment:      eGFR calculated using 2021 CKD-EPI equation.    Calcium 8.8 8.8 - 10.4 mg/dL      Comment:      Reference intervals for this test were updated on 7/16/2024 to reflect our healthy population more accurately. There may be differences in the flagging of prior results with similar values performed with this method. Those prior results can be interpreted in the context of the updated reference intervals.    Glucose 113 (H) 70 - 99 mg/dL    Patient Fasting > 8hrs? Unknown    Hemoglobin   Result Value Ref Range    Hemoglobin 12.5 (L) 13.3 - 17.7 g/dL       If you have any questions or concerns, please call the clinic at the number listed above.       Sincerely,      EDGAR Lomeli CNP

## 2024-07-18 NOTE — RESULT ENCOUNTER NOTE
Isak Guaman    Your kidneys are stable. I do recommend to see a kidney specialist. Let me know if you are agreeable to that. There is one that comes to [place]. Blood counts are stable. Lipid levels are good on the statin. Please let us know if you have any questions.     Take care,    EDGAR Good CNP

## 2024-07-23 LAB
CREAT UR-MCNC: 98 MG/DL
MICROALBUMIN UR-MCNC: 145.7 MG/L
MICROALBUMIN/CREAT UR: 148.67 MG/G CR (ref 0–17)

## 2024-07-23 PROCEDURE — 82570 ASSAY OF URINE CREATININE: CPT | Performed by: NURSE PRACTITIONER

## 2024-07-23 PROCEDURE — 82043 UR ALBUMIN QUANTITATIVE: CPT | Performed by: NURSE PRACTITIONER

## 2024-07-26 ENCOUNTER — TELEPHONE (OUTPATIENT)
Dept: FAMILY MEDICINE | Facility: CLINIC | Age: 84
End: 2024-07-26
Payer: COMMERCIAL

## 2024-07-26 NOTE — TELEPHONE ENCOUNTER
Please let patient know that his kidney function has been down for the last 3 years. Appears stable but I recommend to see Nephrology (they called him to schedule). If he wants to come in and discuss further we can or if  he wants to continue to monitor that is fine to.     ThanksCarina  
RN called patient and informed him of the message below. Patient will call to schedule. He will need to find a ride.     Coni Aguirre RN on 7/26/2024 at 1:55 PM    
2017

## 2024-07-29 ENCOUNTER — TELEPHONE (OUTPATIENT)
Dept: FAMILY MEDICINE | Facility: CLINIC | Age: 84
End: 2024-07-29
Payer: COMMERCIAL

## 2024-07-29 NOTE — TELEPHONE ENCOUNTER
"Patients wife Shannan Trujillo calling regarding the letter she received in the mail regarding lab results.   She is very upset on the phone.   Attempted to tell her why he needed to see a renal provider and asking question about results. When explained she states \"that means nothing to me!\"  [facility] offered a follow up visit and she declined.   Saba sounding on the phone and yelling at RN.     No CTC on file    Ijeoma Herrera RN on 7/29/2024 at 2:06 PM              "

## 2024-08-12 DIAGNOSIS — Z86.73 H/O: CVA (CEREBROVASCULAR ACCIDENT): ICD-10-CM

## 2024-08-12 RX ORDER — CLOPIDOGREL BISULFATE 75 MG/1
75 TABLET ORAL DAILY
Qty: 90 TABLET | Refills: 3 | OUTPATIENT
Start: 2024-08-12

## 2024-08-20 DIAGNOSIS — Z91.038 HISTORY OF ANAPHYLACTIC SHOCK DUE TO INSECT STING: ICD-10-CM

## 2024-08-22 RX ORDER — EPINEPHRINE 0.3 MG/.3ML
INJECTION SUBCUTANEOUS
Qty: 2 EACH | Refills: 1 | Status: SHIPPED | OUTPATIENT
Start: 2024-08-22

## 2024-08-22 NOTE — TELEPHONE ENCOUNTER
Prescription approved per [facility] Refill Protocol     Annemarie Hawthorne     RN MSN

## 2024-08-25 ENCOUNTER — HEALTH MAINTENANCE LETTER (OUTPATIENT)
Age: 84
End: 2024-08-25

## 2024-10-15 ENCOUNTER — TRANSFERRED RECORDS (OUTPATIENT)
Dept: HEALTH INFORMATION MANAGEMENT | Facility: CLINIC | Age: 84
End: 2024-10-15

## 2024-10-29 ENCOUNTER — TELEPHONE (OUTPATIENT)
Dept: FAMILY MEDICINE | Facility: CLINIC | Age: 84
End: 2024-10-29
Payer: COMMERCIAL

## 2024-10-29 NOTE — TELEPHONE ENCOUNTER
Driving Update    Who is Calling: family member, Daughter Analisa    Update: Pt's daughter Analisa calling - No INDIRA on file.  WERO Davalos wrote letters stating that pt is OK to drive 9/15/23 and 10/12/23.  Pt's daughter is asking for another letter that states that pt is still able to safely drive.  Appt recommended and appt made.     Does caller want a call/response back: No

## 2024-11-07 ENCOUNTER — OFFICE VISIT (OUTPATIENT)
Dept: FAMILY MEDICINE | Facility: CLINIC | Age: 84
End: 2024-11-07
Payer: COMMERCIAL

## 2024-11-07 VITALS
HEIGHT: 67 IN | OXYGEN SATURATION: 97 % | TEMPERATURE: 97.8 F | DIASTOLIC BLOOD PRESSURE: 60 MMHG | SYSTOLIC BLOOD PRESSURE: 128 MMHG | RESPIRATION RATE: 16 BRPM | BODY MASS INDEX: 25.74 KG/M2 | WEIGHT: 164 LBS | HEART RATE: 62 BPM

## 2024-11-07 DIAGNOSIS — N18.32 STAGE 3B CHRONIC KIDNEY DISEASE (H): ICD-10-CM

## 2024-11-07 DIAGNOSIS — Z86.73 H/O: CVA (CEREBROVASCULAR ACCIDENT): Primary | ICD-10-CM

## 2024-11-07 DIAGNOSIS — Z93.6 NEPHROSTOMY STATUS (H): ICD-10-CM

## 2024-11-07 DIAGNOSIS — I10 ESSENTIAL (PRIMARY) HYPERTENSION: ICD-10-CM

## 2024-11-07 PROCEDURE — 99214 OFFICE O/P EST MOD 30 MIN: CPT | Performed by: NURSE PRACTITIONER

## 2024-11-07 NOTE — PROGRESS NOTES
Assessment & Plan     H/O: CVA (cerebrovascular accident)  No residual effects. I see no reason that he is unable to safely operate a motor vehicle. Per state guidelines he will have to pass the written and behind the wheel in addition to my opinion.    Essential (primary) hypertension  Controlled, continue Amlodipine    Nephrostomy status (H)  Known issue that I take into account for their medical decisions, no current exacerbations or new concerns, followed by Urology.      Stage 3b chronic kidney disease (H)  Stable, monitoring.     Time spent in chart review in preparation to see patient, time with patient for interview/exam, ordering medications/tests/and/or procedures, and time spent in charting and coordinating care: 30 minutes.       FUTURE APPOINTMENTS:       - Follow-up for annual visit or as needed    Ehsan Schaffer is a 84 year old, presenting for the following health issues:  Patient Request for Note/Letter (Needs letter for DMV)        11/7/2024    11:04 AM   Additional Questions   Roomed by Ashia RALPH     History of Present Illness       Reason for visit:  Letter   He is taking medications regularly.     Patient had a stoke due to carotid stenosis 03/2023. He was discharged from the hospital on multiple new medications including Seroquel. Had a syncopal episode 06/2023. I saw him 08/2023 and we discontinued the Seroquel. At that time he had dysphagia as residual from CVA. His home care OT assistant had concerns with him driving and reported it to the state. He has not been driving for the last 1+ years and would like to drive again as he is the primary transportation for his wife who is legally blind. He is here today with his daughter for a medical opinion on his ability to drive. He reports improvement in his dexterity evidenced by signature has returned to baseline. He reports improved swallowing and speech.      Review of Systems  Constitutional, HEENT, cardiovascular, pulmonary, gi and gu  "systems are negative, except as otherwise noted.      Objective    /60   Pulse 62   Temp 97.8  F (36.6  C) (Tympanic)   Resp 16   Ht 1.702 m (5' 7\")   Wt 74.4 kg (164 lb)   SpO2 97%   BMI 25.69 kg/m    Body mass index is 25.69 kg/m .  Physical Exam   GENERAL: alert and no distress  EYES: Eyes grossly normal to inspection and conjunctivae and sclerae normal  HENT: normal cephalic/atraumatic, oropharynx clear, and oral mucous membranes moist  RESP: lungs clear to auscultation - no rales, rhonchi or wheezes  CV: regular rate and rhythm, normal S1 S2, no S3 or S4, no murmur, click or rub, no peripheral edema  MS: no gross musculoskeletal defects noted, no edema  NEURO: Normal strength and tone, sensory exam grossly normal, mentation intact, oriented times 4, speech normal, and steady gait.  PSYCH: mentation appears normal, affect normal/bright            Signed Electronically by: EDGAR Lomeli CNP    "

## 2024-11-07 NOTE — LETTER
November 7, 2024      Deniz Cortes  [address] [facility] [address]        To Whom It May Concern,     Deniz is under my medical care. His last in person visit and physical evaluation with me was 11/7/24. He has been compliant with my recommendations for his medical care. He has been compliant with the recommendations of his Vascular surgeon. I have found him to be in sound mind and physical ability to safely operate a motor vehicle.  I am aware that his occupational therapist assistant, Carina Roberts had concerns last year regarding his ability to drive. He is no longer on any medications that impair him cognitively or physically.  He is oriented x 4. His cognition and speech have improved since 10/2023.    Sincerely,        EDGAR Lomeli CNP

## 2024-11-15 ENCOUNTER — TELEPHONE (OUTPATIENT)
Dept: FAMILY MEDICINE | Facility: CLINIC | Age: 84
End: 2024-11-15
Payer: COMMERCIAL

## 2024-11-15 NOTE — TELEPHONE ENCOUNTER
Call attempt 1/3.    Spoke with patient to schedule annual wellness exam due Now. Patient did not want to schedule at this time.         Pt declined scheduling at this time.        Gordon Lomeli

## 2024-11-18 NOTE — TELEPHONE ENCOUNTER
Pt calling back again today.  Pt refuses to tell me what the call is about. Pt declines to speak to nurse. Pt demanding to speak to WERO Davalos.  Explained that Carina is not in yet today.  Again demanding that Carina call him back.  Pt told that a nurse will call him back today.    Please call patient and advise.

## 2025-01-06 ENCOUNTER — TELEPHONE (OUTPATIENT)
Dept: FAMILY MEDICINE | Facility: CLINIC | Age: 85
End: 2025-01-06
Payer: COMMERCIAL

## 2025-01-06 DIAGNOSIS — Z93.6 NEPHROSTOMY STATUS (H): Primary | ICD-10-CM

## 2025-01-06 NOTE — TELEPHONE ENCOUNTER
Call their daughter if she is available, otherwise can call EMS to come and take a look. Homecare would not be available today to come but I can put an order in if they are interested in that for future.

## 2025-01-06 NOTE — TELEPHONE ENCOUNTER
"\"My  has a Urostomy and the equipment seems to have changed. I am not able to help him with this since I have poor eyesight,  he is having a lot of issues with getting this connected and we don't drive and we need someone to come out and help him.. He needs to have the bag attached to the pouch before bedtime or he may wet the bed again, we need help asap one way or another...\"     Wants a call back today to let them know what can be done to assist them.     I did advise they will need to qualify for insurance to cover home care as she states: \"We can't come in...\"     Please advise. Soco Mendoza RN                                                        "

## 2025-01-06 NOTE — TELEPHONE ENCOUNTER
RN called Analisa and informed her of the recommendations from Carina Davalos.     Analisa was not aware that Sarbjit and Shannan Trujillo were having issues with the Urostomy and the equipment. Analisa will discuss with parents to see what is needed and check to see if they need to call EMS for help.     Analisa would like to see what can be ordered for Home Care to help with cares.    Coni Aguirre RN on 1/6/2025 at 5:11 PM

## 2025-01-08 ENCOUNTER — TELEPHONE (OUTPATIENT)
Dept: FAMILY MEDICINE | Facility: CLINIC | Age: 85
End: 2025-01-08
Payer: COMMERCIAL

## 2025-01-08 NOTE — TELEPHONE ENCOUNTER
Home Health Care    Reason for call:  Requesting verbal orders from start of care    Orders are needed for this patient.  OT: Eval and treat  Skilled Nursin time per week for 3 weeks and then 1 time every other week for 5 weeks  Social Work: Jeffrey    Pt Provider: Carina Davalos    Phone Number Homecare Nurse can be reached at: [phone]      Could we send this information to you in [facility] or would you prefer to receive a phone call?:   Patient would prefer a phone call   Okay to leave a detailed message?: Yes at Other phone number:  Dilan RN with [facility] [phone]

## 2025-01-09 NOTE — TELEPHONE ENCOUNTER
OK for OT orders if Sarbjit is agreeable, he was not agreeable in the past. OK for nursing and SW.

## 2025-01-22 ENCOUNTER — TELEPHONE (OUTPATIENT)
Dept: FAMILY MEDICINE | Facility: CLINIC | Age: 85
End: 2025-01-22
Payer: COMMERCIAL

## 2025-01-22 NOTE — TELEPHONE ENCOUNTER
Home Care is calling regarding an established patient with [facility].       Requesting orders from: Carina Davalos  Provider is following patient: Yes  Is this a 60-day recertification request?  Yes    Orders Requested    Occupational Therapy  Request for recertification   Frequency:  1x/wk for 4 wks and then 1 X every other week X 2 weeks for ADLs and cognition    Confirmed ok to leave a detailed message with call back.  Contact information confirmed and updated as needed.    Cindy Luz

## 2025-01-22 NOTE — TELEPHONE ENCOUNTER
Left detailed message with okay for orders as requested below    Ijeoma Herrera RN on 1/22/2025 at 4:33 PM     18

## 2025-01-23 ENCOUNTER — MEDICAL CORRESPONDENCE (OUTPATIENT)
Dept: HEALTH INFORMATION MANAGEMENT | Facility: CLINIC | Age: 85
End: 2025-01-23
Payer: COMMERCIAL

## 2025-01-23 ENCOUNTER — TELEPHONE (OUTPATIENT)
Dept: FAMILY MEDICINE | Facility: CLINIC | Age: 85
End: 2025-01-23
Payer: COMMERCIAL

## 2025-01-23 NOTE — TELEPHONE ENCOUNTER
Forms/Letter Request    Type of form/letter: POLST    Do we have the form/letter: Yes: placed in providers box for signature    Who is the form from? Home care    Where did/will the form come from? form was faxed in      How would you like the form/letter returned: Fax : [facility] [phone]    GALILEA Donaldson

## 2025-01-30 ENCOUNTER — OFFICE VISIT (OUTPATIENT)
Dept: FAMILY MEDICINE | Facility: CLINIC | Age: 85
End: 2025-01-30
Payer: COMMERCIAL

## 2025-01-30 VITALS
RESPIRATION RATE: 16 BRPM | BODY MASS INDEX: 24.71 KG/M2 | OXYGEN SATURATION: 96 % | HEART RATE: 71 BPM | WEIGHT: 157.4 LBS | HEIGHT: 67 IN | SYSTOLIC BLOOD PRESSURE: 120 MMHG | DIASTOLIC BLOOD PRESSURE: 56 MMHG | TEMPERATURE: 98.3 F

## 2025-01-30 DIAGNOSIS — Z93.6 NEPHROSTOMY STATUS (H): ICD-10-CM

## 2025-01-30 DIAGNOSIS — Z00.00 MEDICARE ANNUAL WELLNESS VISIT, SUBSEQUENT: Primary | ICD-10-CM

## 2025-01-30 DIAGNOSIS — I69.919 COGNITIVE DEFICITS AS LATE EFFECT OF CEREBROVASCULAR DISEASE: ICD-10-CM

## 2025-01-30 DIAGNOSIS — N18.32 STAGE 3B CHRONIC KIDNEY DISEASE (H): ICD-10-CM

## 2025-01-30 DIAGNOSIS — Z86.73 H/O: CVA (CEREBROVASCULAR ACCIDENT): ICD-10-CM

## 2025-01-30 LAB
ALT SERPL W P-5'-P-CCNC: 74 U/L (ref 0–70)
ANION GAP SERPL CALCULATED.3IONS-SCNC: 12 MMOL/L (ref 7–15)
BUN SERPL-MCNC: 23.9 MG/DL (ref 8–23)
CALCIUM SERPL-MCNC: 8.6 MG/DL (ref 8.8–10.4)
CHLORIDE SERPL-SCNC: 106 MMOL/L (ref 98–107)
CREAT SERPL-MCNC: 1.63 MG/DL (ref 0.67–1.17)
EGFRCR SERPLBLD CKD-EPI 2021: 42 ML/MIN/1.73M2
GLUCOSE SERPL-MCNC: 97 MG/DL (ref 70–99)
HCO3 SERPL-SCNC: 21 MMOL/L (ref 22–29)
HGB BLD-MCNC: 11.3 G/DL (ref 13.3–17.7)
POTASSIUM SERPL-SCNC: 4.1 MMOL/L (ref 3.4–5.3)
SODIUM SERPL-SCNC: 139 MMOL/L (ref 135–145)

## 2025-01-30 ASSESSMENT — PATIENT HEALTH QUESTIONNAIRE - PHQ9
SUM OF ALL RESPONSES TO PHQ QUESTIONS 1-9: 16
SUM OF ALL RESPONSES TO PHQ QUESTIONS 1-9: 16
10. IF YOU CHECKED OFF ANY PROBLEMS, HOW DIFFICULT HAVE THESE PROBLEMS MADE IT FOR YOU TO DO YOUR WORK, TAKE CARE OF THINGS AT HOME, OR GET ALONG WITH OTHER PEOPLE: SOMEWHAT DIFFICULT

## 2025-01-30 ASSESSMENT — ACTIVITIES OF DAILY LIVING (ADL): CURRENT_FUNCTION: NEEDS ASSISTANCE

## 2025-01-30 NOTE — PROGRESS NOTES
Assessment & Plan     Medicare annual wellness visit, subsequent      Stage 3b chronic kidney disease (H)  Known issue that I take into account for their medical decisions, no current exacerbations or new concerns    - Basic metabolic panel  (Ca, Cl, CO2, Creat, Gluc, K, Na, BUN); Future  - Hemoglobin; Future  - Basic metabolic panel  (Ca, Cl, CO2, Creat, Gluc, K, Na, BUN)  - Hemoglobin    Nephrostomy status (H)  Known issue that I take into account for their medical decisions, no current exacerbations or new concerns  Continue home care to help with care and supplies.     H/O: CVA (cerebrovascular accident)    - ALT; Future  - ALT    Cognitive deficits as late effect of cerebrovascular disease            Depression Screening Follow Up        1/30/2025     3:54 PM   PHQ   PHQ-9 Total Score 16    Q9: Thoughts of better off dead/self-harm past 2 weeks Not at all       Patient-reported           Follow Up Actions Taken  Patient declined referral.       FUTURE APPOINTMENTS:       - Follow-up for annual visit or as needed    See Patient Instructions    Ehsan Schaffer is a 84 year old, presenting for the following health issues:  Medicare Visit        1/30/2025     4:06 PM   Additional Questions   Roomed by Ashia RALPH     [facility]       Annual Wellness Visit     Patient has been advised of split billing requirements and indicates understanding: Yes     Health Care Directive  Patient has a Health Care Directive on file  Advance care planning document is on file and is current.  In general, how would you rate your overall physical health? (!) FAIR   Discussed with patient their rating of physical health; information has been provided.   Do you have a special diet?  Regular (no restrictions)         No data to display              Do you see a dentist two times every year?  (!) NO  The patient was instructed to see the dentist every 6 months.   Have you been more tired than usual lately?  (!) YES   Discussed possible causes of  fatigue.   If you drink alcohol do you typically have >3 drinks per day or >7 drinks per week? No  Do you have a current opioid prescription? No  Do you use any other controlled substances or medications that are not prescribed by a provider? None  Social History     Tobacco Use    Smoking status: Former     Current packs/day: 0.00     Types: Cigarettes     Quit date: 2007     Years since quittin.7    Smokeless tobacco: Never   Vaping Use    Vaping status: Never Used   Substance Use Topics    Alcohol use: No    Drug use: No       Needs assistance for the following daily activities: (!) TRANSPORTATION and (!) SHOPPING  Which of the following safety concerns are present in your home?  none identified   Do you (or your family members) have any concerns about your safety while driving?  (!) YES   Addressed any concerns about safety while driving.    Do you have any of the following hearing concerns?: (!) IT'S HARD TO FOLLOW A CONVERSATION IN A NOISY RESTAURANT OR CROWDED ROOM and (!) TROUBLE UNDERSTANDING SOFT OR WHISPERED SPEECH  In the past 6 months, have you been bothered by leaking of urine? No        2023   Social Factors   Worry food won't last until get money to buy more No   Food not last or not have enough money for food? No   Do you have housing? (Housing is defined as stable permanent housing and does not include staying ouside in a car, in a tent, in an abandoned building, in an overnight shelter, or couch-surfing.) Yes   Are you worried about losing your housing? No   Lack of transportation? No   Unable to get utilities (heat,electricity)? No         2025   Fall Risk   Gait Speed Test (Document in seconds) 5   Gait Speed Test Interpretation Less than or equal to 5.00 seconds - PASS        Today's PHQ-9 Score:       2025     3:54 PM   PHQ-9 SCORE   PHQ-9 Total Score MyChart 16 (Moderately severe depression)   PHQ-9 Total Score 16        Patient-reported               Reviewed and  updated as needed this visit by Provider                    Labs reviewed in EPIC  BP Readings from Last 3 Encounters:   01/30/25 120/56   11/07/24 128/60   07/16/24 112/54    Wt Readings from Last 3 Encounters:   01/30/25 71.4 kg (157 lb 6.4 oz)   11/07/24 74.4 kg (164 lb)   07/16/24 75.3 kg (166 lb)                    Current providers sharing in care for this patient include:  Patient Care Team:  Carina Davalos APRN CNP as PCP - General (Family Medicine)  Carina Davalos APRN CNP as Assigned PCP    The following health maintenance items are reviewed in Epic and correct as of today:  Health Maintenance   Topic Date Due    PARATHYROID  Never done    PHOSPHORUS  Never done    URINALYSIS  Never done    ZOSTER IMMUNIZATION (1 of 2) Never done    MEDICARE ANNUAL WELLNESS VISIT  Never done    RSV VACCINE (1 - 1-dose 75+ series) Never done    Pneumococcal Vaccine: 50+ Years (2 of 2 - PPSV23) 03/14/2020    INFLUENZA VACCINE (1) 09/01/2024    COVID-19 Vaccine (2 - 2024-25 season) 09/01/2024    ANNUAL REVIEW OF HM ORDERS  09/12/2024    MICROALBUMIN  01/23/2025    BMP  07/16/2025    LIPID  07/16/2025    HEMOGLOBIN  07/16/2025    FALL RISK ASSESSMENT  11/07/2025    ADVANCE CARE PLANNING  01/28/2030    DTAP/TDAP/TD IMMUNIZATION (2 - Td or Tdap) 10/21/2033    PHQ-2 (once per calendar year)  Completed    ALK PHOS  Completed    HPV IMMUNIZATION  Aged Out    MENINGITIS IMMUNIZATION  Aged Out    RSV MONOCLONAL ANTIBODY  Aged Out    LUNG CANCER SCREENING  Discontinued       Appropriate preventive services were discussed with this patient, including applicable screening as appropriate for fall prevention, nutrition, physical activity, Tobacco-use cessation, weight loss and cognition.  Checklist reviewing preventive services available has been given to the patient.           1/30/2025   Mini Cog   Clock Draw Score 2 Normal   3 Item Recall 0 objects recalled   Mini Cog Total Score 2                  Review of  "Systems  Constitutional, HEENT, cardiovascular, pulmonary, gi and gu systems are negative, except as otherwise noted.      Objective    /56   Pulse 71   Temp 98.3  F (36.8  C) (Tympanic)   Resp 16   Ht 1.702 m (5' 7\")   Wt 71.4 kg (157 lb 6.4 oz)   SpO2 96%   BMI 24.65 kg/m    Body mass index is 24.65 kg/m .  Physical Exam   GENERAL: alert and no distress  RESP: lungs clear to auscultation - no rales, rhonchi or wheezes  CV: regular rate and rhythm, normal S1 S2, no S3 or S4, no murmur, click or rub, no peripheral edema  MS: no gross musculoskeletal defects noted, no edema  NEURO: Normal strength and tone and abnormal mental status - - short term memory loss            Signed Electronically by: EDGAR Lomeli CNP    "

## 2025-02-03 NOTE — TELEPHONE ENCOUNTER
Herminia calling in regards to POLST.    POLST completed and scanned into chart 01/28.    Please fax to [phone] and notify Herminia when faxed.    Thank you  Mak RALPH RN  [facility]

## 2025-02-10 ENCOUNTER — PATIENT OUTREACH (OUTPATIENT)
Dept: CARE COORDINATION | Facility: CLINIC | Age: 85
End: 2025-02-10
Payer: COMMERCIAL

## 2025-02-10 NOTE — LETTER
Dear Sarbjit,    I am a clinic community health worker who works with EDGAR Lomeli CNP with the [facility]. I have been trying to reach you recently to introduce Clinic Care Coordination. Below is a description of clinic care coordination and how I can further assist you.       The clinic care coordination team is made up of a registered nurse, , financial resource worker and community health worker who understand the health care system. The goal of clinic care coordination is to help you manage your health and improve access to the health care system. Our team works alongside your provider to assist you in determining your health and social needs. We can help you obtain health care and community resources, providing you with necessary information and education. We can work with you through any barriers and develop a care plan that helps coordinate and strengthen the communication between you and your care team.  Our services are voluntary and are offered without charge to you personally.    Please feel free to contact me with any questions or concerns regarding care coordination and what we can offer.      We are focused on providing you with the highest-quality healthcare experience possible.    Sincerely,     BALBIR Garber  [phone]  [facility]

## 2025-02-10 NOTE — PROGRESS NOTES
Clinic Care Coordination Contact  [facility]    Clinical Data: Care Coordinator Outreach    Outreach Documentation Number of Outreach Attempt   2/10/2025   9:48 AM 1       Briefly spoke with patient. Patient would like call back once wife is present.      Plan: Care Coordinator will try to reach patient again in 1-2 business days.    BALBIR Garber  [phone]  [facility]

## 2025-02-11 ENCOUNTER — TELEPHONE (OUTPATIENT)
Dept: FAMILY MEDICINE | Facility: CLINIC | Age: 85
End: 2025-02-11
Payer: COMMERCIAL

## 2025-02-11 NOTE — PROGRESS NOTES
Clinic Care Coordination Contact  [facility]/Voicemail    Clinical Data: Care Coordinator Outreach    Outreach Documentation Number of Outreach Attempt   2/10/2025   9:48 AM 1   2/11/2025   1:45 PM 2       Left message on patient's voicemail with call back information and requested return call.      Plan: Care Coordinator will send care coordination introduction letter with care coordinator contact information and explanation of care coordination services via [employer]. Care Coordinator will do no further outreaches at this time.    Kareen King, BALBIR  [phone]  [facility] [place]

## 2025-02-11 NOTE — TELEPHONE ENCOUNTER
Reason for Call:  Home Health Care    Jayde with [facility] called regarding (reason for call): cognitive tests scores. Scored 11 out of 30 on the SLUMS. 3.5 out of 5.8 on CPT. Indicates a severe cognitive impairment. Pt is managing his medications independently.     OT: ongoing OT 1 x a week for 2 weeks, 1 time every other week for 2 weeks for ADLS and cognition       Can we leave a detailed message on this number? YES    Phone number patient can be reached at: [phone]      .Shayna Hebert PSC

## 2025-02-12 ENCOUNTER — TELEPHONE (OUTPATIENT)
Dept: FAMILY MEDICINE | Facility: CLINIC | Age: 85
End: 2025-02-12
Payer: COMMERCIAL

## 2025-02-12 ENCOUNTER — TELEPHONE (OUTPATIENT)
Dept: VASCULAR SURGERY | Facility: CLINIC | Age: 85
End: 2025-02-12
Payer: COMMERCIAL

## 2025-02-12 DIAGNOSIS — I69.919 COGNITIVE DEFICITS AS LATE EFFECT OF CEREBROVASCULAR DISEASE: ICD-10-CM

## 2025-02-12 DIAGNOSIS — Z93.6 NEPHROSTOMY STATUS (H): Primary | ICD-10-CM

## 2025-02-12 NOTE — TELEPHONE ENCOUNTER
"Vascular Referral Intake    Appointment note (to be pasted into appt note. Also add where additional info is located ie: outside images pushed to PACS, in Epic, sent to HIM, etc):   Referred by JOSSELYN Davalos for urostomy    Specialty: Ostomy clinic    Specific Provider if Necessary:  [place] ostomy    Visit Type: New    Time Frame: Next Available    Testing/Imaging Needed Before Consult: none    Rosalba or bed needed: No    *Schedulers: Please send welcome letter to patient after appointment(s) have been scheduled*    *will need updated order for \"ostomy care referral\", likely will need multiple visits. Please schedule this as a 60 min appt.    Patient with long time established urostomy. Had revision in 2020. Pt has dementia and cannot help much with his pouch management. Wife needs to help, but also cannot see well due to her own vision issues.     "

## 2025-02-12 NOTE — TELEPHONE ENCOUNTER
Please inform patient of referral and give contact information. May need assistance making an appt. Thanks  EDGAR [facility]

## 2025-02-12 NOTE — TELEPHONE ENCOUNTER
RN called patient and wife and informed them of the order. Both patient and wife did not understand what the order was for.     RN called AMISHA White with [facility] and informed her the order is in and provided the telephone number to call to schedule. [phone]     Cindy will inform patient and wife about what is recommended and help get the patient scheduled.    Coni Aguirre RN on 2/12/2025 at 5:36 PM

## 2025-02-12 NOTE — TELEPHONE ENCOUNTER
Home Care is calling regarding an established patient with [facility].  Requesting orders from: Carina Davalos  OTHER ACTION: referral request  Is this a request for a temporary pause in the home care episode?  No    Orders Requested  Referral for WOC RN at [facility]  : home care RN states pt would like to look at different ostomy pouch options. Home care has tried to go over this with pt & pt is frustrated with home care.  *I have pended a WOC referral for your consideration.    Caller: Cindy GARCIA  Home Care Agency: [facility]  Phone number Home Care can be reached at: [phone]  Okay to leave a detailed message?: Yes    Cindy Juan RN

## 2025-02-13 NOTE — TELEPHONE ENCOUNTER
Home care comes to the house to help with this and they have no idea what this was for so they dont need appointment

## 2025-02-13 NOTE — TELEPHONE ENCOUNTER
"Per chart notes-   \"home care RN states pt would like to look at different ostomy pouch options. Home care has tried to go over this with pt & pt is frustrated with home care.\"    Called and spoke with pt, explained WO nurse role and how this differs from homecare. He would like us to discuss this with his spouse who is not home. He is asking for a call back tomorrow. Will try again tomorrow.     Kayla Herrera RN, CWOCN   "

## 2025-07-27 DIAGNOSIS — Z86.73 H/O: CVA (CEREBROVASCULAR ACCIDENT): ICD-10-CM

## 2025-07-27 DIAGNOSIS — I10 ESSENTIAL (PRIMARY) HYPERTENSION: ICD-10-CM

## 2025-07-28 RX ORDER — FOLIC ACID 1 MG/1
1000 TABLET ORAL DAILY
Qty: 90 TABLET | Refills: 3 | Status: SHIPPED | OUTPATIENT
Start: 2025-07-28

## 2025-07-28 RX ORDER — ATORVASTATIN CALCIUM 40 MG/1
40 TABLET, FILM COATED ORAL DAILY
Qty: 90 TABLET | Refills: 3 | Status: SHIPPED | OUTPATIENT
Start: 2025-07-28

## 2025-07-28 RX ORDER — AMLODIPINE BESYLATE 10 MG/1
10 TABLET ORAL DAILY
Qty: 90 TABLET | Refills: 1 | Status: SHIPPED | OUTPATIENT
Start: 2025-07-28